# Patient Record
Sex: FEMALE | Race: WHITE | Employment: FULL TIME | URBAN - METROPOLITAN AREA
[De-identification: names, ages, dates, MRNs, and addresses within clinical notes are randomized per-mention and may not be internally consistent; named-entity substitution may affect disease eponyms.]

---

## 2018-10-31 ENCOUNTER — HOSPITAL ENCOUNTER (OUTPATIENT)
Dept: PHYSICAL THERAPY | Age: 48
Discharge: HOME | End: 2018-10-31
Payer: COMMERCIAL

## 2018-10-31 PROCEDURE — 97110 THERAPEUTIC EXERCISES: CPT

## 2018-10-31 PROCEDURE — 97162 PT EVAL MOD COMPLEX 30 MIN: CPT

## 2018-10-31 NOTE — THERAPY EVALUATION
Anne Whiting  : 1970 [id number] [facility],[address]  [address] [place] [address]  [phone]   [phone]        OUTPATIENT PHYSICAL THERAPY:Initial Assessment 2018         ICD-10: Treatment Diagnosis: Pain in left ankle and joints of left foot (M25.572); Difficulty in walking, not elsewhere classified (R26.2)  Precautions/Allergies:   Patient has no known allergies. Fall Risk Score: 0 (? 5 = High Risk)  MD Orders: evaluate and treat MEDICAL/REFERRING DIAGNOSIS:  Left ankle pain [M25.572]   DATE OF ONSET: chronic history with worsening over the past 6 weeks  REFERRING PHYSICIAN: Salas Moss MD  RETURN PHYSICIAN APPOINTMENT: 18     INITIAL ASSESSMENT:  Ms. Chirag Rodriguez presents to therapy with left ankle pain secondary to midsubstance achilles tendinosis with braden's deformity. She has restricted calf flexibility. She has weakness through the calf musculature, intrinsic foot musculature, and poor motor control of the hip musculature. Symptoms are moderately reactive. Skilled therapy is required to return to prior level of function. PROBLEM LIST (Impacting functional limitations):  1. Decreased Strength  2. Decreased ADL/Functional Activities  3. Decreased Ambulation Ability/Technique  4. Decreased Balance  5. Increased Pain  6. Decreased Activity Tolerance  7. Decreased Flexibility/Joint Mobility INTERVENTIONS PLANNED:  1. Family Education  2. Gait Training  3. Home Exercise Program (HEP)  4. Manual Therapy  5. Neuromuscular Re-education/Strengthening  6. Range of Motion (ROM)  7. Therapeutic Activites  8. Therapeutic Exercise/Strengthening  9. modalities    TREATMENT PLAN:  Effective Dates: 10/31/2018 TO 2019 (90 days). Frequency/Duration: 2 times a week for 90 Days  GOALS: (Goals have been discussed and agreed upon with patient.)  Short-Term Functional Goals: Time Frame: 2 weeks  1. Patient will be independent with HEP.    2. Patient will report pain <2/10 with daily activity. Discharge Goals: Time Frame: 6 weeks  1. Patient will report no pain with daily activity to perform work duties without use of boot. 2. Patient will be able to perform 10 single leg heel raises through full ROM to restore functional calf strength. 3. Patient will improve ankle DF to 15 ° to decrease strain on the achilles with daily activity such as stair descent. Rehabilitation Potential For Stated Goals: Excellent  Regarding Sonia Barfield's therapy, I certify that the treatment plan above will be carried out by a therapist or under their direction. Thank you for this referral,  Eunice Pastrana DPT                     HISTORY:   History of Present Injury/Illness (Reason for Referral):  Patient presents to therapy with primary complaint of left posterior heel pain. She notes a chronic history of heel pain that has been worsening over the past 6 weeks. She has recently had excruciating pain in the morning, pain with end range movements into passive plantar flexion, and pain with general walking activities. 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 Since her initial MD consult, she has begun to use a walking boot during the day and a compressive sock in the evening. This has significantly been helping. She has also begun to take meloxicam which has also been helping. Past Medical History/Comorbidities:   Ms. Lisandro Franks  has a past medical history of Morbid obesity ([address] Nausea & vomiting, Psychiatric disorder, and Unspecified adverse effect of anesthesia. Ms. Lisandro Franks  has a past surgical history that includes hx  section; hx other surgical (, ); and LIPOMA EXCISION MID BACK (N/A, 2/3/2014). Social History/Living Environment:     Patient lives at home with her family. Prior Level of Function/Work/Activity:  Patient is a .   Dominant Side:         RIGHT  Current Medications:    Current Outpatient Medications:     ondansetron (ZOFRAN ODT) 4 mg disintegrating tablet, Take 4 mg by mouth every eight (8) hours as needed for Nausea., Disp: , Rfl:     Desvenlafaxine (PRISTIQ) 100 mg Tb24, Take 1 Tab by mouth nightly., Disp: , Rfl:     multivitamin (ONE A DAY) tablet, Take 1 Tab by mouth daily. , Disp: , Rfl:     cholecalciferol, vitamin D3, (VITAMIN D3) 2,000 unit tab, Take 1 Tab by mouth daily. , Disp: , Rfl:    Date Last Reviewed:  10/31/2018   # of Personal Factors/Comorbidities that affect the Plan of Care: 1-2: MODERATE COMPLEXITY   EXAMINATION:   Observation/Orthostatic Postural Assessment:          Magui's deformity noted at the posterior heel. Mild rearfoot eversion present from initial contact to midstance. Minimal navicular drop present through stance. She has early heel rise in stance phase of gait. Palpation:          Tender through midsubstance of the achilles and over the posterior heel. ROM:     Ankle DF: 10 ° L; 15 ° R  Ankle PF: 40 ° B  Ankle inversion: WNL  Ankle eversion: WNL  Great toe extension is mildly restricted  Hip rotation is WNL      Strength: Ankle DF: 5/5   Ankle PF: 4/5 L; 5/5 R  Ankle inversion :4+/5 L; 5/5 R  Ankle eversion: 5/5 B  Hip extension: 4/5 B  Hip abduction: 4/5 B      Special Tests:          Slump and SLR testing are negative for provocation of symptoms. Mild discomfort with overpressure into ankle PF  Neurological Screen:        Sensation is fully intact  Functional Mobility:         Independent  Balance:          Increased hip righting reactions with left single leg stance   Body Structures Involved:  1. Nerves  2. Bones  3. Joints  4. Muscles  5. Ligaments Body Functions Affected:  1. Sensory/Pain  2. Neuromusculoskeletal  3. Movement Related Activities and Participation Affected:  1. Learning and Applying Knowledge  2. General Tasks and Demands  3. Mobility  4. Self Care  5. Domestic Life  6.  Interpersonal Interactions and Relationships  7. Community, Social and [place]   # of elements that affect the Plan of Care: 3: MODERATE COMPLEXITY   CLINICAL PRESENTATION:   Presentation: Evolving clinical presentation with changing clinical characteristics: MODERATE COMPLEXITY   CLINICAL DECISION MAKING:   Outcome Measure: Tool Used: Lower Extremity Functional Scale (LEFS)  Score:  Initial: 37/80 Most Recent: X/80 (Date: -- )   Interpretation of Score: 20 questions each scored on a 5 point scale with 0 representing \"extreme difficulty or unable to perform\" and 4 representing \"no difficulty\". The lower the score, the greater the functional disability. 80/80 represents no disability. Minimal detectable change is 9 points. Score 80 79-63 62-48 47-32 31-16 15-1 0   Modifier CH CI CJ CK CL CM CN       Medical Necessity:   · Patient is expected to demonstrate progress in strength, range of motion, balance and coordination to increase independence with community mobility and return to prior level of function. Reason for Services/Other Comments:  · Patient has demonstrated an improvement in functional level by independent performance of HEP. Use of outcome tool(s) and clinical judgement create a POC that gives a: Questionable prediction of patient's progress: MODERATE COMPLEXITY   TREATMENT:   (In addition to Assessment/Re-Assessment sessions the following treatments were rendered)  THERAPEUTIC EXERCISE: (see below for minutes):  Exercises per grid below to improve mobility, strength, balance and coordination. Required minimal verbal and manual cues to promote proper body alignment, promote proper body posture and promote proper body mechanics. Progressed resistance, range, repetitions and complexity of movement as indicated.   MANUAL THERAPY: (see below for minutes): Joint mobilization and Soft tissue mobilization was utilized and necessary because of the patient's restricted joint motion, painful spasm, loss of articular motion and restricted motion of soft tissue. MODALITIES: (see below for minutes): To decrease pain     Date: 10/31/18        Modalities:                                Therapeutic Exercise: 20 min       Stretching [id number] self ankle mobilization with knee drive to wall       Bridge [id number] double leg       Hip abduction 2x10       Calf raises Discussed progression of seated and standing double leg calf raises from [id number] to 3x8 reps                       Proprioceptive Activities:                                Manual Therapy:        Soft tissue mobilization Next visit               Therapeutic Activities:                                        HEP: see 10/31/18 flow sheet for specifics. Educated on managing load of calf raises based on symptom response. [employer] Portal  Treatment/Session Assessment:  Patient is independent with performance of above described home program.    · Pain/ Symptoms: Initial:   10/10 Post Session:  No increase following today's treatment session. ·   Compliance with Program/Exercises: Will assess as treatment progresses. · Recommendations/Intent for next treatment session: \"Next visit will focus on advancements to more challenging activities\".   Total Treatment Duration:  PT Patient Time In/Time Out  Time In: 1530  Time Out: [address], DPT

## 2018-10-31 NOTE — PROGRESS NOTES
Ambulatory/Rehab Services H2 Model Falls Risk Assessment    Risk Factor Pts. ·   Confusion/Disorientation/Impulsivity  []    4 ·   Symptomatic Depression  []   2 ·   Altered Elimination  []   1 ·   Dizziness/Vertigo  []   1 ·   Gender (Male)  []   1 ·   Any administered antiepileptics (anticonvulsants):  []   2 ·   Any administered benzodiazepines:  []   1 ·   Visual Impairment (specify):  []   1 ·   Portable Oxygen Use  []   1 ·   Orthostatic ? BP  []   1 ·   History of Recent Falls (within 3 mos.)  []   5     Ability to Rise from Chair (choose one) Pts. ·   Ability to rise in a single movement  [x]   0 ·   Pushes up, successful in one attempt  []   1 ·   Multiple attempts, but successful  []   3 ·   Unable to rise without assistance  []   4   Total: (5 or greater = High Risk) 0     Falls Prevention Plan:   []                Physical Limitations to Exercise (specify):   []                Mobility Assistance Device (type):   []                Exercise/Equipment Adaptation (specify):    ©2010 [facility] of [address] Patent #[id number].  Federal Law prohibits the replication, distribution or use without written permission from [facility] [employer]

## 2018-11-02 ENCOUNTER — APPOINTMENT (OUTPATIENT)
Dept: PHYSICAL THERAPY | Age: 48
End: 2018-11-02
Payer: COMMERCIAL

## 2018-11-05 ENCOUNTER — HOSPITAL ENCOUNTER (OUTPATIENT)
Dept: PHYSICAL THERAPY | Age: 48
Discharge: HOME | End: 2018-11-05
Payer: COMMERCIAL

## 2018-11-05 PROCEDURE — 97140 MANUAL THERAPY 1/> REGIONS: CPT

## 2018-11-05 PROCEDURE — 97110 THERAPEUTIC EXERCISES: CPT

## 2018-11-05 NOTE — THERAPY EVALUATION
Bee [address] : 1970 [address] @ [address] [place] [address]. [phone]   [phone] OUTPATIENT PHYSICAL THERAPY:Daily Note 2018 ICD-10: Treatment Diagnosis: Pain in left ankle and joints of left foot (M25.572); Difficulty in walking, not elsewhere classified (R26.2) Precautions/Allergies:  
Patient has no known allergies. Fall Risk Score: 0 (? 5 = High Risk) MD Orders: evaluate and treat MEDICAL/REFERRING DIAGNOSIS: 
Pain in left ankle and joints of left foot [M25.572] DATE OF ONSET: chronic history with worsening over the past 6 weeks REFERRING PHYSICIAN: Antelmo Pan MD 
RETURN PHYSICIAN APPOINTMENT: 18 INITIAL ASSESSMENT:  Ms. Hemalatha Waldron presents to therapy with left ankle pain secondary to midsubstance achilles tendinosis with braden's deformity. She has restricted calf flexibility. She has weakness through the calf musculature, intrinsic foot musculature, and poor motor control of the hip musculature. Symptoms are moderately reactive. Skilled therapy is required to return to prior level of function. PROBLEM LIST (Impacting functional limitations): 1. Decreased Strength 2. Decreased ADL/Functional Activities 3. Decreased Ambulation Ability/Technique 4. Decreased Balance 5. Increased Pain 6. Decreased Activity Tolerance 7. Decreased Flexibility/Joint Mobility INTERVENTIONS PLANNED: 
1. Family Education 2. Gait Training 3. Home Exercise Program (HEP) 4. Manual Therapy 5. Neuromuscular Re-education/Strengthening 6. Range of Motion (ROM) 7. Therapeutic Activites 8. Therapeutic Exercise/Strengthening 9. modalities TREATMENT PLAN: 
Effective Dates: 10/31/2018 TO 2019 (90 days). Frequency/Duration: 2 times a week for 90 Days GOALS: (Goals have been discussed and agreed upon with patient.) Short-Term Functional Goals: Time Frame: 2 weeks 1. Patient will be independent with HEP. 2. Patient will report pain <2/10 with daily activity. Discharge Goals: Time Frame: 6 weeks 1. Patient will report no pain with daily activity to perform work duties without use of boot. 2. Patient will be able to perform 10 single leg heel raises through full ROM to restore functional calf strength. 3. Patient will improve ankle DF to 15 ° to decrease strain on the achilles with daily activity such as stair descent. Rehabilitation Potential For Stated Goals: Excellent Regarding Consuelomarie Verdugo therapy, I certify that the treatment plan above will be carried out by a therapist or under their direction. Thank you for this referral, Sean Jorgensen DPT HISTORY:  
History of Present Injury/Illness (Reason for Referral): 
Patient presents to therapy with primary complaint of left posterior heel pain. She notes a chronic history of heel pain that has been worsening over the past 6 weeks. She has recently had excruciating pain in the morning, pain with end range movements into passive plantar flexion, and pain with general walking activities. 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 Since her initial MD consult, she has begun to use a walking boot during the day and a compressive sock in the evening. This has significantly been helping. She has also begun to take meloxicam which has also been helping. Past Medical History/Comorbidities:  
Ms. Jose Manuel Garrison  has a past medical history of Morbid obesity ([address] Nausea & vomiting, Psychiatric disorder, and Unspecified adverse effect of anesthesia. Ms. Jose Manuel Garrison  has a past surgical history that includes hx  section; hx other surgical (, ); and LIPOMA EXCISION MID BACK (N/A, 2/3/2014). Social History/Living Environment:  
  Patient lives at home with her family. Prior Level of Function/Work/Activity: 
Patient is a . Dominant Side:  
      RIGHT Current Medications: Current Outpatient Medications:  
  ondansetron (ZOFRAN ODT) 4 mg disintegrating tablet, Take 4 mg by mouth every eight (8) hours as needed for Nausea., Disp: , Rfl:  
  Desvenlafaxine (PRISTIQ) 100 mg Tb24, Take 1 Tab by mouth nightly., Disp: , Rfl:  
  multivitamin (ONE A DAY) tablet, Take 1 Tab by mouth daily. , Disp: , Rfl:  
  cholecalciferol, vitamin D3, (VITAMIN D3) 2,000 unit tab, Take 1 Tab by mouth daily. , Disp: , Rfl:   
Date Last Reviewed:  11/5/2018 EXAMINATION:  
Observation/Orthostatic Postural Assessment:   
      Magui's deformity noted at the posterior heel. Mild rearfoot eversion present from initial contact to midstance. Minimal navicular drop present through stance. She has early heel rise in stance phase of gait. Palpation:   
      Tender through midsubstance of the achilles and over the posterior heel. ROM:   
 Ankle DF: 10 ° L; 15 ° R Ankle PF: 40 ° B Ankle inversion: WNL Ankle eversion: WNL Great toe extension is mildly restricted Hip rotation is WNL Strength: Ankle DF: 5/5 Ankle PF: 4/5 L; 5/5 R Ankle inversion :4+/5 L; 5/5 R Ankle eversion: 5/5 B Hip extension: 4/5 B Hip abduction: 4/5 B Special Tests:   
      Slump and SLR testing are negative for provocation of symptoms. Mild discomfort with overpressure into ankle PF Neurological Screen: 
      Sensation is fully intact Functional Mobility:  
      Independent Balance:   
      Increased hip righting reactions with left single leg stance Body Structures Involved: 1. Nerves 2. Bones 3. Joints 4. Muscles 5. Ligaments Body Functions Affected: 1. Sensory/Pain 2. Neuromusculoskeletal 
3. Movement Related Activities and Participation Affected: 1. Learning and Applying Knowledge 2. General Tasks and Demands 3. Mobility 4. Self Care 5. Domestic Life 6. Interpersonal Interactions and Relationships 7. Community, Social and [place] CLINICAL PRESENTATION:  
CLINICAL DECISION MAKING:  
 Outcome Measure: Tool Used: Lower Extremity Functional Scale (LEFS) Score:  Initial: 37/80 Most Recent: X/80 (Date: -- ) Interpretation of Score: 20 questions each scored on a 5 point scale with 0 representing \"extreme difficulty or unable to perform\" and 4 representing \"no difficulty\". The lower the score, the greater the functional disability. 80/80 represents no disability. Minimal detectable change is 9 points. Score 80 79-63 62-48 47-32 31-16 15-1 0 Modifier CH CI CJ CK CL CM CN Medical Necessity:  
· Patient is expected to demonstrate progress in strength, range of motion, balance and coordination to increase independence with community mobility and return to prior level of function. Reason for Services/Other Comments: 
· Patient has demonstrated an improvement in functional level by independent performance of HEP. TREATMENT:  
(In addition to Assessment/Re-Assessment sessions the following treatments were rendered) THERAPEUTIC EXERCISE: (see below for minutes):  Exercises per grid below to improve mobility, strength, balance and coordination. Required minimal verbal and manual cues to promote proper body alignment, promote proper body posture and promote proper body mechanics. Progressed resistance, range, repetitions and complexity of movement as indicated. MANUAL THERAPY: (see below for minutes): Joint mobilization and Soft tissue mobilization was utilized and necessary because of the patient's restricted joint motion, painful spasm, loss of articular motion and restricted motion of soft tissue. MODALITIES: (see below for minutes): To decrease pain Date: 10/31/18  11/05/18 (visit 2) Modalities:       
       
       
       
Therapeutic Exercise: 20 min 30 min Stretching [id number] self ankle mobilization with knee drive to wall repeat Bridge [id number] double leg Hip abduction 2x10 Calf raises Discussed progression of seated and standing double leg calf raises from [id number] to 3x8 reps Seated 15# 3x12; standing eccentric 4x8 with slow lower Inversion/Eversion  Black tband 2x15 each NuStep  5 min L4 Proprioceptive Activities:       
       
       
       
Manual Therapy:  15 min Soft tissue mobilization Next visit To posterior calf, posterior tibialis Therapeutic Activities: HEP: see 10/31/18 flow sheet for specifics. Educated on managing load of calf raises based on symptom response. [employer] Portal 
Treatment/Session Assessment:  Able to progress to standing eccentrics. This is initially moderately painful over the first few reps but becomes pain free by end of the final set. · Pain/ Symptoms: Initial:   4/10 \"It's feeling better. I still get a lot of stiffness in the morning and after I've been sitting awhile. \" Post Session:  No increase following today's treatment session. ·  
Compliance with Program/Exercises: Will assess as treatment progresses. · Recommendations/Intent for next treatment session: \"Next visit will focus on advancements to more challenging activities\". Total Treatment Duration: PT Patient Time In/Time Out Time In: 5681 Time Out: 1530 [place] DPT

## 2018-11-07 ENCOUNTER — HOSPITAL ENCOUNTER (OUTPATIENT)
Dept: PHYSICAL THERAPY | Age: 48
Discharge: HOME | End: 2018-11-07
Payer: COMMERCIAL

## 2018-11-07 PROCEDURE — 97110 THERAPEUTIC EXERCISES: CPT

## 2018-11-07 PROCEDURE — 97140 MANUAL THERAPY 1/> REGIONS: CPT

## 2018-11-07 NOTE — THERAPY EVALUATION
Barby Jung : 1970 [id number] @ [address] [address]. [phone]   [phone] OUTPATIENT PHYSICAL THERAPY:Daily Note 2018 ICD-10: Treatment Diagnosis: Pain in left ankle and joints of left foot (M25.572); Difficulty in walking, not elsewhere classified (R26.2) Precautions/Allergies:  
Patient has no known allergies. Fall Risk Score: 0 (? 5 = High Risk) MD Orders: evaluate and treat MEDICAL/REFERRING DIAGNOSIS: 
Pain in left ankle and joints of left foot [M25.572] DATE OF ONSET: chronic history with worsening over the past 6 weeks REFERRING PHYSICIAN: Eze Jensen MD 
RETURN PHYSICIAN APPOINTMENT: 18 INITIAL ASSESSMENT:  Ms. Ko Fierro presents to therapy with left ankle pain secondary to midsubstance achilles tendinosis with braden's deformity. She has restricted calf flexibility. She has weakness through the calf musculature, intrinsic foot musculature, and poor motor control of the hip musculature. Symptoms are moderately reactive. Skilled therapy is required to return to prior level of function. PROBLEM LIST (Impacting functional limitations): 1. Decreased Strength 2. Decreased ADL/Functional Activities 3. Decreased Ambulation Ability/Technique 4. Decreased Balance 5. Increased Pain 6. Decreased Activity Tolerance 7. Decreased Flexibility/Joint Mobility INTERVENTIONS PLANNED: 
1. Family Education 2. Gait Training 3. Home Exercise Program (HEP) 4. Manual Therapy 5. Neuromuscular Re-education/Strengthening 6. Range of Motion (ROM) 7. Therapeutic Activites 8. Therapeutic Exercise/Strengthening 9. modalities TREATMENT PLAN: 
Effective Dates: 10/31/2018 TO 2019 (90 days). Frequency/Duration: 2 times a week for 90 Days GOALS: (Goals have been discussed and agreed upon with patient.) Short-Term Functional Goals: Time Frame: 2 weeks 1. Patient will be independent with HEP. 2. Patient will report pain <2/10 with daily activity. Discharge Goals: Time Frame: 6 weeks 1. Patient will report no pain with daily activity to perform work duties without use of boot. 2. Patient will be able to perform 10 single leg heel raises through full ROM to restore functional calf strength. 3. Patient will improve ankle DF to 15 ° to decrease strain on the achilles with daily activity such as stair descent. Rehabilitation Potential For Stated Goals: Excellent Regarding Consuelo Verdugo therapy, I certify that the treatment plan above will be carried out by a therapist or under their direction. Thank you for this referral, Zachary Madsen DPT HISTORY:  
History of Present Injury/Illness (Reason for Referral): 
Patient presents to therapy with primary complaint of left posterior heel pain. She notes a chronic history of heel pain that has been worsening over the past 6 weeks. She has recently had excruciating pain in the morning, pain with end range movements into passive plantar flexion, and pain with general walking activities. 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 Since her initial MD consult, she has begun to use a walking boot during the day and a compressive sock in the evening. This has significantly been helping. She has also begun to take meloxicam which has also been helping. Past Medical History/Comorbidities:  
Ms. Jolynn Thornton  has a past medical history of Morbid obesity ([address] Nausea & vomiting, Psychiatric disorder, and Unspecified adverse effect of anesthesia. Ms. Jolynn Thornton  has a past surgical history that includes hx  section; hx other surgical (, ); and LIPOMA EXCISION MID BACK (N/A, 2/3/2014). Social History/Living Environment:  
  Patient lives at home with her family. Prior Level of Function/Work/Activity: 
Patient is a . Dominant Side:  
      RIGHT Current Medications: Current Outpatient Medications:  
  ondansetron (ZOFRAN ODT) 4 mg disintegrating tablet, Take 4 mg by mouth every eight (8) hours as needed for Nausea., Disp: , Rfl:  
  Desvenlafaxine (PRISTIQ) 100 mg Tb24, Take 1 Tab by mouth nightly., Disp: , Rfl:  
  multivitamin (ONE A DAY) tablet, Take 1 Tab by mouth daily. , Disp: , Rfl:  
  cholecalciferol, vitamin D3, (VITAMIN D3) 2,000 unit tab, Take 1 Tab by mouth daily. , Disp: , Rfl:   
Date Last Reviewed:  11/7/2018 EXAMINATION:  
Observation/Orthostatic Postural Assessment:   
      Magui's deformity noted at the posterior heel. Mild rearfoot eversion present from initial contact to midstance. Minimal navicular drop present through stance. She has early heel rise in stance phase of gait. Palpation:   
      Tender through midsubstance of the achilles and over the posterior heel. ROM:   
 Ankle DF: 10 ° L; 15 ° R Ankle PF: 40 ° B Ankle inversion: WNL Ankle eversion: WNL Great toe extension is mildly restricted Hip rotation is WNL Strength: Ankle DF: 5/5 Ankle PF: 4/5 L; 5/5 R Ankle inversion :4+/5 L; 5/5 R Ankle eversion: 5/5 B Hip extension: 4/5 B Hip abduction: 4/5 B Special Tests:   
      Slump and SLR testing are negative for provocation of symptoms. Mild discomfort with overpressure into ankle PF Neurological Screen: 
      Sensation is fully intact Functional Mobility:  
      Independent Balance:   
      Increased hip righting reactions with left single leg stance Body Structures Involved: 1. Nerves 2. Bones 3. Joints 4. Muscles 5. Ligaments Body Functions Affected: 1. Sensory/Pain 2. Neuromusculoskeletal 
3. Movement Related Activities and Participation Affected: 1. Learning and Applying Knowledge 2. General Tasks and Demands 3. Mobility 4. Self Care 5. Domestic Life 6. Interpersonal Interactions and Relationships 7. Community, Social and [place] CLINICAL PRESENTATION:  
CLINICAL DECISION MAKING:  
 Outcome Measure: Tool Used: Lower Extremity Functional Scale (LEFS) Score:  Initial: 37/80 Most Recent: X/80 (Date: -- ) Interpretation of Score: 20 questions each scored on a 5 point scale with 0 representing \"extreme difficulty or unable to perform\" and 4 representing \"no difficulty\". The lower the score, the greater the functional disability. 80/80 represents no disability. Minimal detectable change is 9 points. Score 80 79-63 62-48 47-32 31-16 15-1 0 Modifier CH CI CJ CK CL CM CN Medical Necessity:  
· Patient is expected to demonstrate progress in strength, range of motion, balance and coordination to increase independence with community mobility and return to prior level of function. Reason for Services/Other Comments: 
· Patient has demonstrated an improvement in functional level by independent performance of HEP. TREATMENT:  
(In addition to Assessment/Re-Assessment sessions the following treatments were rendered) THERAPEUTIC EXERCISE: (see below for minutes):  Exercises per grid below to improve mobility, strength, balance and coordination. Required minimal verbal and manual cues to promote proper body alignment, promote proper body posture and promote proper body mechanics. Progressed resistance, range, repetitions and complexity of movement as indicated. MANUAL THERAPY: (see below for minutes): Joint mobilization and Soft tissue mobilization was utilized and necessary because of the patient's restricted joint motion, painful spasm, loss of articular motion and restricted motion of soft tissue. MODALITIES: (see below for minutes): To decrease pain Date: 10/31/18  11/05/18 (visit 2) 11/07/18 (visit 3) Modalities:       
       
       
       
Therapeutic Exercise: 20 min 30 min 30 min Stretching [id number] self ankle mobilization with knee drive to wall repeat repeat Bridge [id number] double leg Hip abduction 2x10 Calf raises Discussed progression of seated and standing double leg calf raises from [id number] to 3x8 reps Seated 15# 3x12; standing eccentric 4x8 with slow lower repeat Inversion/Eversion  Black tband 2x15 each repeat NuStep  5 min L4 repeat Proprioceptive Activities:       
       
       
       
Manual Therapy:  15 min 15 min Soft tissue mobilization Next visit To posterior calf, posterior tibialis repeat Therapeutic Activities: HEP: see 10/31/18 flow sheet for specifics. Educated on managing load of calf raises based on symptom response. [employer] Portal 
Treatment/Session Assessment: Patient demonstrates sufficient symptom resolution to progress to increased resistance on seated calf raise (with LAQ machine). · Pain/ Symptoms: Initial:   3/10 \"The new calf raises are feeling good. It seems to be much less painful. \" Post Session:  No increase following today's treatment session. ·  
Compliance with Program/Exercises: Will assess as treatment progresses. · Recommendations/Intent for next treatment session: \"Next visit will focus on advancements to more challenging activities\". Total Treatment Duration: PT Patient Time In/Time Out Time In: 0631 Time Out: 1700 Ronda Alaniz DPT

## 2018-11-12 ENCOUNTER — APPOINTMENT (OUTPATIENT)
Dept: PHYSICAL THERAPY | Age: 48
End: 2018-11-12
Payer: COMMERCIAL

## 2018-11-13 ENCOUNTER — HOSPITAL ENCOUNTER (OUTPATIENT)
Dept: PHYSICAL THERAPY | Age: 48
Discharge: HOME | End: 2018-11-13
Payer: COMMERCIAL

## 2018-11-13 PROCEDURE — 97140 MANUAL THERAPY 1/> REGIONS: CPT

## 2018-11-13 PROCEDURE — 97110 THERAPEUTIC EXERCISES: CPT

## 2018-11-13 NOTE — THERAPY EVALUATION
Ramon De Guzman : 1970 [id number] [address] @ [address], [address] [place] [address] [phone]   [phone] OUTPATIENT PHYSICAL THERAPY:Daily Note 2018 ICD-10: Treatment Diagnosis: Pain in left ankle and joints of left foot (M25.572); Difficulty in walking, not elsewhere classified (R26.2) Precautions/Allergies:  
Patient has no known allergies. Fall Risk Score: 0 (? 5 = High Risk) MD Orders: evaluate and treat MEDICAL/REFERRING DIAGNOSIS: 
No admission diagnoses are documented for this encounter. DATE OF ONSET: chronic history with worsening over the past 6 weeks REFERRING PHYSICIAN: Orlando Jones MD 
RETURN PHYSICIAN APPOINTMENT: 18 INITIAL ASSESSMENT:  Ms. Jeremy Luna presents to therapy with left ankle pain secondary to midsubstance achilles tendinosis with braden's deformity. She has restricted calf flexibility. She has weakness through the calf musculature, intrinsic foot musculature, and poor motor control of the hip musculature. Symptoms are moderately reactive. Skilled therapy is required to return to prior level of function. PROBLEM LIST (Impacting functional limitations): 1. Decreased Strength 2. Decreased ADL/Functional Activities 3. Decreased Ambulation Ability/Technique 4. Decreased Balance 5. Increased Pain 6. Decreased Activity Tolerance 7. Decreased Flexibility/Joint Mobility INTERVENTIONS PLANNED: 
1. Family Education 2. Gait Training 3. Home Exercise Program (HEP) 4. Manual Therapy 5. Neuromuscular Re-education/Strengthening 6. Range of Motion (ROM) 7. Therapeutic Activites 8. Therapeutic Exercise/Strengthening 9. modalities TREATMENT PLAN: 
Effective Dates: 10/31/2018 TO 2019 (90 days). Frequency/Duration: 2 times a week for 90 Days GOALS: (Goals have been discussed and agreed upon with patient.) Short-Term Functional Goals: Time Frame: 2 weeks 1. Patient will be independent with HEP. 2. Patient will report pain <2/10 with daily activity. Discharge Goals: Time Frame: 6 weeks 1. Patient will report no pain with daily activity to perform work duties without use of boot. 2. Patient will be able to perform 10 single leg heel raises through full ROM to restore functional calf strength. 3. Patient will improve ankle DF to 15 ° to decrease strain on the achilles with daily activity such as stair descent. Rehabilitation Potential For Stated Goals: Excellent Regarding Consuelo Kartik therapy, I certify that the treatment plan above will be carried out by a therapist or under their direction. Thank you for this referral, Ramona Deleon DPT HISTORY:  
History of Present Injury/Illness (Reason for Referral): 
Patient presents to therapy with primary complaint of left posterior heel pain. She notes a chronic history of heel pain that has been worsening over the past 6 weeks. She has recently had excruciating pain in the morning, pain with end range movements into passive plantar flexion, and pain with general walking activities. 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 Since her initial MD consult, she has begun to use a walking boot during the day and a compressive sock in the evening. This has significantly been helping. She has also begun to take meloxicam which has also been helping. Past Medical History/Comorbidities:  
Ms. Yeni Scruggs  has a past medical history of Morbid obesity ([address] Nausea & vomiting, Psychiatric disorder, and Unspecified adverse effect of anesthesia. Ms. Yeni Scruggs  has a past surgical history that includes hx  section; hx other surgical (, ); and LIPOMA EXCISION MID BACK (N/A, 2/3/2014). Social History/Living Environment:  
  Patient lives at home with her family. Prior Level of Function/Work/Activity: 
Patient is a . Dominant Side:  
      RIGHT Current Medications:   
Current Outpatient Medications:  
  ondansetron (ZOFRAN ODT) 4 mg disintegrating tablet, Take 4 mg by mouth every eight (8) hours as needed for Nausea., Disp: , Rfl:  
  Desvenlafaxine (PRISTIQ) 100 mg Tb24, Take 1 Tab by mouth nightly., Disp: , Rfl:  
  multivitamin (ONE A DAY) tablet, Take 1 Tab by mouth daily. , Disp: , Rfl:  
  cholecalciferol, vitamin D3, (VITAMIN D3) 2,000 unit tab, Take 1 Tab by mouth daily. , Disp: , Rfl:   
Date Last Reviewed:  11/13/2018 EXAMINATION:  
Observation/Orthostatic Postural Assessment:   
      Magui's deformity noted at the posterior heel. Mild rearfoot eversion present from initial contact to midstance. Minimal navicular drop present through stance. She has early heel rise in stance phase of gait. Palpation:   
      Tender through midsubstance of the achilles and over the posterior heel. ROM:   
 Ankle DF: 10 ° L; 15 ° R Ankle PF: 40 ° B Ankle inversion: WNL Ankle eversion: WNL Great toe extension is mildly restricted Hip rotation is WNL Strength: Ankle DF: 5/5 Ankle PF: 4/5 L; 5/5 R Ankle inversion :4+/5 L; 5/5 R Ankle eversion: 5/5 B Hip extension: 4/5 B Hip abduction: 4/5 B Special Tests:   
      Slump and SLR testing are negative for provocation of symptoms. Mild discomfort with overpressure into ankle PF Neurological Screen: 
      Sensation is fully intact Functional Mobility:  
      Independent Balance:   
      Increased hip righting reactions with left single leg stance Body Structures Involved: 1. Nerves 2. Bones 3. Joints 4. Muscles 5. Ligaments Body Functions Affected: 1. Sensory/Pain 2. Neuromusculoskeletal 
3. Movement Related Activities and Participation Affected: 1. Learning and Applying Knowledge 2. General Tasks and Demands 3. Mobility 4. Self Care 5. Domestic Life 6. Interpersonal Interactions and Relationships 7. Community, Social and [place] CLINICAL PRESENTATION:  
CLINICAL DECISION MAKING:  
Outcome Measure: Tool Used: Lower Extremity Functional Scale (LEFS) Score:  Initial: 37/80 Most Recent: X/80 (Date: -- ) Interpretation of Score: 20 questions each scored on a 5 point scale with 0 representing \"extreme difficulty or unable to perform\" and 4 representing \"no difficulty\". The lower the score, the greater the functional disability. 80/80 represents no disability. Minimal detectable change is 9 points. Score 80 79-63 62-48 47-32 31-16 15-1 0 Modifier CH CI CJ CK CL CM CN Medical Necessity:  
· Patient is expected to demonstrate progress in strength, range of motion, balance and coordination to increase independence with community mobility and return to prior level of function. Reason for Services/Other Comments: 
· Patient has demonstrated an improvement in functional level by independent performance of HEP. TREATMENT:  
(In addition to Assessment/Re-Assessment sessions the following treatments were rendered) THERAPEUTIC EXERCISE: (see below for minutes):  Exercises per grid below to improve mobility, strength, balance and coordination. Required minimal verbal and manual cues to promote proper body alignment, promote proper body posture and promote proper body mechanics. Progressed resistance, range, repetitions and complexity of movement as indicated. MANUAL THERAPY: (see below for minutes): Joint mobilization and Soft tissue mobilization was utilized and necessary because of the patient's restricted joint motion, painful spasm, loss of articular motion and restricted motion of soft tissue. MODALITIES: (see below for minutes): To decrease pain Date: 10/31/18  11/05/18 (visit 2) 11/07/18 (visit 3) 11/13/18 (visit 4) Modalities:       
       
       
       
Therapeutic Exercise: 20 min 30 min 30 min 30 min Stretching [id number] self ankle mobilization with knee drive to wall repeat repeat repeat Bridge [id number] double leg Hip abduction 2x10 Calf raises Discussed progression of seated and standing double leg calf raises from [id number] to 3x8 reps Seated 15# 3x12; standing eccentric 4x8 with slow lower repeat Standing eccentric 4x8 from wedge, seated 50# on LAQ 4x8 Inversion/Eversion  Black tband 2x15 each repeat repeat Lateral band walks    Black clx x 3 laps NuStep  5 min L4 repeat 5 min L4 Proprioceptive Activities:       
       
       
       
Manual Therapy:  15 min 15 min 15 min Soft tissue mobilization Next visit To posterior calf, posterior tibialis repeat repeat Therapeutic Activities: HEP: see 10/31/18 flow sheet for specifics. Educated on managing load of calf raises based on symptom response. "[employer] Portal 
Treatment/Session Assessment: Progressed to 50# on seated calf raise with no symptom provocation. · Pain/ Symptoms: Initial:   3/10 Having decreased pain with using CAM boot. Utilizing heel lift in contralateral shoe which helps decrease hip pain. Post Session:  No increase following today's treatment session. ·  
Compliance with Program/Exercises: Will assess as treatment progresses. · Recommendations/Intent for next treatment session: \"Next visit will focus on advancements to more challenging activities\". Total Treatment Duration: PT Patient Time In/Time Out Time In: 1400 Time Out: 1427 [place] Farooq DPT

## 2018-11-14 ENCOUNTER — HOSPITAL ENCOUNTER (OUTPATIENT)
Dept: PHYSICAL THERAPY | Age: 48
Discharge: HOME | End: 2018-11-14
Payer: COMMERCIAL

## 2018-11-14 PROCEDURE — 97140 MANUAL THERAPY 1/> REGIONS: CPT

## 2018-11-14 PROCEDURE — 97110 THERAPEUTIC EXERCISES: CPT

## 2018-11-14 NOTE — THERAPY EVALUATION
Natalie Medina : 1970 Ka[place] Baca @ [address] [address]. [phone]   [phone] OUTPATIENT PHYSICAL THERAPY:Daily Note 2018 ICD-10: Treatment Diagnosis: Pain in left ankle and joints of left foot (M25.572); Difficulty in walking, not elsewhere classified (R26.2) Precautions/Allergies:  
Patient has no known allergies. Fall Risk Score: 0 (? 5 = High Risk) MD Orders: evaluate and treat MEDICAL/REFERRING DIAGNOSIS: 
Pain in left ankle and joints of left foot [M25.572] DATE OF ONSET: chronic history with worsening over the past 6 weeks REFERRING PHYSICIAN: Kendal Goldberg MD 
RETURN PHYSICIAN APPOINTMENT: 18 INITIAL ASSESSMENT:  Ms. Renetta Barragan presents to therapy with left ankle pain secondary to midsubstance achilles tendinosis with braden's deformity. She has restricted calf flexibility. She has weakness through the calf musculature, intrinsic foot musculature, and poor motor control of the hip musculature. Symptoms are moderately reactive. Skilled therapy is required to return to prior level of function. PROBLEM LIST (Impacting functional limitations): 1. Decreased Strength 2. Decreased ADL/Functional Activities 3. Decreased Ambulation Ability/Technique 4. Decreased Balance 5. Increased Pain 6. Decreased Activity Tolerance 7. Decreased Flexibility/Joint Mobility INTERVENTIONS PLANNED: 
1. Family Education 2. Gait Training 3. Home Exercise Program (HEP) 4. Manual Therapy 5. Neuromuscular Re-education/Strengthening 6. Range of Motion (ROM) 7. Therapeutic Activites 8. Therapeutic Exercise/Strengthening 9. modalities TREATMENT PLAN: 
Effective Dates: 10/31/2018 TO 2019 (90 days). Frequency/Duration: 2 times a week for 90 Days GOALS: (Goals have been discussed and agreed upon with patient.) Short-Term Functional Goals: Time Frame: 2 weeks 1. Patient will be independent with HEP. 2. Patient will report pain <2/10 with daily activity. Discharge Goals: Time Frame: 6 weeks 1. Patient will report no pain with daily activity to perform work duties without use of boot. 2. Patient will be able to perform 10 single leg heel raises through full ROM to restore functional calf strength. 3. Patient will improve ankle DF to 15 ° to decrease strain on the achilles with daily activity such as stair descent. Rehabilitation Potential For Stated Goals: Excellent Regarding Consuelo Kartik therapy, I certify that the treatment plan above will be carried out by a therapist or under their direction. Thank you for this referral, Brennen Luevano DPT HISTORY:  
History of Present Injury/Illness (Reason for Referral): 
Patient presents to therapy with primary complaint of left posterior heel pain. She notes a chronic history of heel pain that has been worsening over the past 6 weeks. She has recently had excruciating pain in the morning, pain with end range movements into passive plantar flexion, and pain with general walking activities. 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 Since her initial MD consult, she has begun to use a walking boot during the day and a compressive sock in the evening. This has significantly been helping. She has also begun to take meloxicam which has also been helping. Past Medical History/Comorbidities:  
Ms. Wilman Barroso  has a past medical history of Morbid obesity ([address] Nausea & vomiting, Psychiatric disorder, and Unspecified adverse effect of anesthesia. Ms. Wilman Barroso  has a past surgical history that includes hx  section; hx other surgical (, ); and LIPOMA EXCISION MID BACK (N/A, 2/3/2014). Social History/Living Environment:  
  Patient lives at home with her family. Prior Level of Function/Work/Activity: 
Patient is a . Dominant Side:  
      RIGHT Current Medications: Current Outpatient Medications:  
  ondansetron (ZOFRAN ODT) 4 mg disintegrating tablet, Take 4 mg by mouth every eight (8) hours as needed for Nausea., Disp: , Rfl:  
  Desvenlafaxine (PRISTIQ) 100 mg Tb24, Take 1 Tab by mouth nightly., Disp: , Rfl:  
  multivitamin (ONE A DAY) tablet, Take 1 Tab by mouth daily. , Disp: , Rfl:  
  cholecalciferol, vitamin D3, (VITAMIN D3) 2,000 unit tab, Take 1 Tab by mouth daily. , Disp: , Rfl:   
Date Last Reviewed:  11/14/2018 EXAMINATION:  
Observation/Orthostatic Postural Assessment:   
      Magui's deformity noted at the posterior heel. Mild rearfoot eversion present from initial contact to midstance. Minimal navicular drop present through stance. She has early heel rise in stance phase of gait. Palpation:   
      Tender through midsubstance of the achilles and over the posterior heel. ROM:   
 Ankle DF: 10 ° L; 15 ° R Ankle PF: 40 ° B Ankle inversion: WNL Ankle eversion: WNL Great toe extension is mildly restricted Hip rotation is WNL Strength: Ankle DF: 5/5 Ankle PF: 4/5 L; 5/5 R Ankle inversion :4+/5 L; 5/5 R Ankle eversion: 5/5 B Hip extension: 4/5 B Hip abduction: 4/5 B Special Tests:   
      Slump and SLR testing are negative for provocation of symptoms. Mild discomfort with overpressure into ankle PF Neurological Screen: 
      Sensation is fully intact Functional Mobility:  
      Independent Balance:   
      Increased hip righting reactions with left single leg stance Body Structures Involved: 1. Nerves 2. Bones 3. Joints 4. Muscles 5. Ligaments Body Functions Affected: 1. Sensory/Pain 2. Neuromusculoskeletal 
3. Movement Related Activities and Participation Affected: 1. Learning and Applying Knowledge 2. General Tasks and Demands 3. Mobility 4. Self Care 5. Domestic Life 6. Interpersonal Interactions and Relationships 7. Community, Social and [place] CLINICAL PRESENTATION:  
CLINICAL DECISION MAKING:  
 Outcome Measure: Tool Used: Lower Extremity Functional Scale (LEFS) Score:  Initial: 37/80 Most Recent: X/80 (Date: -- ) Interpretation of Score: 20 questions each scored on a 5 point scale with 0 representing \"extreme difficulty or unable to perform\" and 4 representing \"no difficulty\". The lower the score, the greater the functional disability. 80/80 represents no disability. Minimal detectable change is 9 points. Score 80 79-63 62-48 47-32 31-16 15-1 0 Modifier CH CI CJ CK CL CM CN Medical Necessity:  
· Patient is expected to demonstrate progress in strength, range of motion, balance and coordination to increase independence with community mobility and return to prior level of function. Reason for Services/Other Comments: 
· Patient has demonstrated an improvement in functional level by independent performance of HEP. TREATMENT:  
(In addition to Assessment/Re-Assessment sessions the following treatments were rendered) THERAPEUTIC EXERCISE: (see below for minutes):  Exercises per grid below to improve mobility, strength, balance and coordination. Required minimal verbal and manual cues to promote proper body alignment, promote proper body posture and promote proper body mechanics. Progressed resistance, range, repetitions and complexity of movement as indicated. MANUAL THERAPY: (see below for minutes): Joint mobilization and Soft tissue mobilization was utilized and necessary because of the patient's restricted joint motion, painful spasm, loss of articular motion and restricted motion of soft tissue. MODALITIES: (see below for minutes): To decrease pain Date: 10/31/18  11/05/18 (visit 2) 11/07/18 (visit 3) 11/13/18 (visit 4) 11/14/18 (visit 5) Modalities:       
       
       
       
Therapeutic Exercise: 20 min 30 min 30 min 30 min 30 min Stretching [id number] self ankle mobilization with knee drive to wall repeat repeat repeat repeat Bridge [id number] double leg Hip abduction 2x10 Calf raises Discussed progression of seated and standing double leg calf raises from [id number] to 3x8 reps Seated 15# 3x12; standing eccentric 4x8 with slow lower repeat Standing eccentric 4x8 from wedge, seated 50# on LAQ 4x8 Seated 75# 4x8 single leg with forefoot on half roll Inversion/Eversion  Black tband 2x15 each repeat repeat repeat Lateral band walks    Black clx x 3 laps Great toe flexion     3x20 green tband NuStep  5 min L4 repeat 5 min L4 6 min L$  
Proprioceptive Activities:       
       
       
       
Manual Therapy:  15 min 15 min 15 min 15 min Soft tissue mobilization Next visit To posterior calf, posterior tibialis repeat repeat Repeat; focusing on gastroc-soleus junction at mid calf Therapeutic Activities: HEP: see 10/31/18 flow sheet for specifics. Educated on managing load of calf raises based on symptom response. [employer] Portal 
Treatment/Session Assessment: Progressed to 50# on seated calf raise with no symptom provocation. · Pain/ Symptoms: Initial:   3/10 \"It's feeling better. I've started to not use the boot quite as much. \" Post Session:  No increase following today's treatment session. ·  
Compliance with Program/Exercises: Will assess as treatment progresses. · Recommendations/Intent for next treatment session: \"Next visit will focus on advancements to more challenging activities\". Total Treatment Duration: PT Patient Time In/Time Out Time In: 6978 Time Out: 1530 Merrily Every, DPT

## 2018-11-19 ENCOUNTER — HOSPITAL ENCOUNTER (OUTPATIENT)
Dept: PHYSICAL THERAPY | Age: 48
Discharge: HOME | End: 2018-11-19
Payer: COMMERCIAL

## 2018-11-19 PROCEDURE — 97140 MANUAL THERAPY 1/> REGIONS: CPT

## 2018-11-19 PROCEDURE — 97110 THERAPEUTIC EXERCISES: CPT

## 2018-11-19 NOTE — THERAPY EVALUATION
Leeanne Mcgee : 1970 [address] [facility] @ [address] [place] [address] [phone]   [phone] OUTPATIENT PHYSICAL THERAPY:Daily Note 2018 ICD-10: Treatment Diagnosis: Pain in left ankle and joints of left foot (M25.572); Difficulty in walking, not elsewhere classified (R26.2) Precautions/Allergies:  
Patient has no known allergies. Fall Risk Score: 0 (? 5 = High Risk) MD Orders: evaluate and treat MEDICAL/REFERRING DIAGNOSIS: 
Pain in left ankle and joints of left foot [M25.572] DATE OF ONSET: chronic history with worsening over the past 6 weeks REFERRING PHYSICIAN: Krystal Springer MD 
RETURN PHYSICIAN APPOINTMENT: 18 INITIAL ASSESSMENT:  Ms. Heather Robledo presents to therapy with left ankle pain secondary to midsubstance achilles tendinosis with braden's deformity. She has restricted calf flexibility. She has weakness through the calf musculature, intrinsic foot musculature, and poor motor control of the hip musculature. Symptoms are moderately reactive. Skilled therapy is required to return to prior level of function. PROBLEM LIST (Impacting functional limitations): 1. Decreased Strength 2. Decreased ADL/Functional Activities 3. Decreased Ambulation Ability/Technique 4. Decreased Balance 5. Increased Pain 6. Decreased Activity Tolerance 7. Decreased Flexibility/Joint Mobility INTERVENTIONS PLANNED: 
1. Family Education 2. Gait Training 3. Home Exercise Program (HEP) 4. Manual Therapy 5. Neuromuscular Re-education/Strengthening 6. Range of Motion (ROM) 7. Therapeutic Activites 8. Therapeutic Exercise/Strengthening 9. modalities TREATMENT PLAN: 
Effective Dates: 10/31/2018 TO 2019 (90 days). Frequency/Duration: 2 times a week for 90 Days GOALS: (Goals have been discussed and agreed upon with patient.) Short-Term Functional Goals: Time Frame: 2 weeks 1. Patient will be independent with HEP. 2. Patient will report pain <2/10 with daily activity. Discharge Goals: Time Frame: 6 weeks 1. Patient will report no pain with daily activity to perform work duties without use of boot. 2. Patient will be able to perform 10 single leg heel raises through full ROM to restore functional calf strength. 3. Patient will improve ankle DF to 15 ° to decrease strain on the achilles with daily activity such as stair descent. Rehabilitation Potential For Stated Goals: Excellent Regarding Consuelomarie Verdugo therapy, I certify that the treatment plan above will be carried out by a therapist or under their direction. Thank you for this referral, Lo Harrell DPT HISTORY:  
History of Present Injury/Illness (Reason for Referral): 
Patient presents to therapy with primary complaint of left posterior heel pain. She notes a chronic history of heel pain that has been worsening over the past 6 weeks. She has recently had excruciating pain in the morning, pain with end range movements into passive plantar flexion, and pain with general walking activities. 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 Since her initial MD consult, she has begun to use a walking boot during the day and a compressive sock in the evening. This has significantly been helping. She has also begun to take meloxicam which has also been helping. Past Medical History/Comorbidities:  
Ms. Brandt Land  has a past medical history of Morbid obesity ([address] Nausea & vomiting, Psychiatric disorder, and Unspecified adverse effect of anesthesia. Ms. Brandt Land  has a past surgical history that includes hx  section; hx other surgical (, ); and LIPOMA EXCISION MID BACK (N/A, 2/3/2014). Social History/Living Environment:  
  Patient lives at home with her family. Prior Level of Function/Work/Activity: 
Patient is a . Dominant Side:  
      RIGHT Current Medications: Current Outpatient Medications:  
  ondansetron (ZOFRAN ODT) 4 mg disintegrating tablet, Take 4 mg by mouth every eight (8) hours as needed for Nausea., Disp: , Rfl:  
  Desvenlafaxine (PRISTIQ) 100 mg Tb24, Take 1 Tab by mouth nightly., Disp: , Rfl:  
  multivitamin (ONE A DAY) tablet, Take 1 Tab by mouth daily. , Disp: , Rfl:  
  cholecalciferol, vitamin D3, (VITAMIN D3) 2,000 unit tab, Take 1 Tab by mouth daily. , Disp: , Rfl:   
Date Last Reviewed:  11/19/2018 EXAMINATION:  
Observation/Orthostatic Postural Assessment:   
      Magui's deformity noted at the posterior heel. Mild rearfoot eversion present from initial contact to midstance. Minimal navicular drop present through stance. She has early heel rise in stance phase of gait. Palpation:   
      Tender through midsubstance of the achilles and over the posterior heel. ROM:   
 Ankle DF: 10 ° L; 15 ° R Ankle PF: 40 ° B Ankle inversion: WNL Ankle eversion: WNL Great toe extension is mildly restricted Hip rotation is WNL Strength: Ankle DF: 5/5 Ankle PF: 4/5 L; 5/5 R Ankle inversion :4+/5 L; 5/5 R Ankle eversion: 5/5 B Hip extension: 4/5 B Hip abduction: 4/5 B Special Tests:   
      Slump and SLR testing are negative for provocation of symptoms. Mild discomfort with overpressure into ankle PF Neurological Screen: 
      Sensation is fully intact Functional Mobility:  
      Independent Balance:   
      Increased hip righting reactions with left single leg stance Body Structures Involved: 1. Nerves 2. Bones 3. Joints 4. Muscles 5. Ligaments Body Functions Affected: 1. Sensory/Pain 2. Neuromusculoskeletal 
3. Movement Related Activities and Participation Affected: 1. Learning and Applying Knowledge 2. General Tasks and Demands 3. Mobility 4. Self Care 5. Domestic Life 6. Interpersonal Interactions and Relationships 7. Community, Social and [place] CLINICAL PRESENTATION:  
CLINICAL DECISION MAKING:  
 Outcome Measure: Tool Used: Lower Extremity Functional Scale (LEFS) Score:  Initial: 37/80 Most Recent: X/80 (Date: -- ) Interpretation of Score: 20 questions each scored on a 5 point scale with 0 representing \"extreme difficulty or unable to perform\" and 4 representing \"no difficulty\". The lower the score, the greater the functional disability. 80/80 represents no disability. Minimal detectable change is 9 points. Score 80 79-63 62-48 47-32 31-16 15-1 0 Modifier CH CI CJ CK CL CM CN Medical Necessity:  
· Patient is expected to demonstrate progress in strength, range of motion, balance and coordination to increase independence with community mobility and return to prior level of function. Reason for Services/Other Comments: 
· Patient has demonstrated an improvement in functional level by independent performance of HEP. TREATMENT:  
(In addition to Assessment/Re-Assessment sessions the following treatments were rendered) THERAPEUTIC EXERCISE: (see below for minutes):  Exercises per grid below to improve mobility, strength, balance and coordination. Required minimal verbal and manual cues to promote proper body alignment, promote proper body posture and promote proper body mechanics. Progressed resistance, range, repetitions and complexity of movement as indicated. MANUAL THERAPY: (see below for minutes): Joint mobilization and Soft tissue mobilization was utilized and necessary because of the patient's restricted joint motion, painful spasm, loss of articular motion and restricted motion of soft tissue. MODALITIES: (see below for minutes): To decrease pain Date: 10/31/18  11/05/18 (visit 2) 11/07/18 (visit 3) 11/13/18 (visit 4) 11/14/18 (visit 5) 11/19/18 (visit 6) Modalities:        
        
        
        
Therapeutic Exercise: 20 min 30 min 30 min 30 min 30 min 15 min Stretching [id number] self ankle mobilization with knee drive to wall repeat repeat repeat repeat Bridge [id number] double leg Hip abduction 2x10 Calf raises Discussed progression of seated and standing double leg calf raises from [id number] to 3x8 reps Seated 15# 3x12; standing eccentric 4x8 with slow lower repeat Standing eccentric 4x8 from wedge, seated 50# on LAQ 4x8 Seated 75# 4x8 single leg with forefoot on half roll Seated 75# 3x115 single leg with forefoot on half roll Inversion/Eversion  Black tband 2x15 each repeat repeat repeat Black tband 3x15 in and ev Lateral band walks    Black clx x 3 laps Great toe flexion     3x20 green tband NuStep  5 min L4 repeat 5 min L4 6 min L$ 5 min L4 Proprioceptive Activities:        
        
        
        
Manual Therapy:  15 min 15 min 15 min 15 min 15 min Soft tissue mobilization Next visit To posterior calf, posterior tibialis repeat repeat Repeat; focusing on gastroc-soleus junction at mid calf repeat Therapeutic Activities: HEP: see 10/31/18 flow sheet for specifics. Educated on managing load of calf raises based on symptom response. [facility] Portal 
Treatment/Session Assessment: Treatment limited due to patient late arrival. No symptoms noted at end range of DF loading with seated calf raise. · Pain/ Symptoms: Initial:   3/10 \"I was able to go without the boot all weekend. I wore it just a little today. I'm still getting some swelling at the back of the heel, but it's not nearly as sensitive. \" Post Session:  No increase following today's treatment session. ·  
Compliance with Program/Exercises: Will assess as treatment progresses. · Recommendations/Intent for next treatment session: \"Next visit will focus on advancements to more challenging activities\". Total Treatment Duration: PT Patient Time In/Time Out Time In: 1500 Time Out: 1530 Feliz José DPT

## 2018-11-21 ENCOUNTER — HOSPITAL ENCOUNTER (OUTPATIENT)
Dept: PHYSICAL THERAPY | Age: 48
Discharge: HOME | End: 2018-11-21
Payer: COMMERCIAL

## 2018-11-21 PROCEDURE — 97110 THERAPEUTIC EXERCISES: CPT

## 2018-11-21 PROCEDURE — 97140 MANUAL THERAPY 1/> REGIONS: CPT

## 2018-11-21 NOTE — THERAPY EVALUATION
Zachary Fuentes : 1970 [id number] [facility],[address] @ [address] [place] [address]. [phone]   [phone] OUTPATIENT PHYSICAL THERAPY:Daily Note 2018 ICD-10: Treatment Diagnosis: Pain in left ankle and joints of left foot (M25.572); Difficulty in walking, not elsewhere classified (R26.2) Precautions/Allergies:  
Patient has no known allergies. Fall Risk Score: 0 (? 5 = High Risk) MD Orders: evaluate and treat MEDICAL/REFERRING DIAGNOSIS: 
Pain in left ankle and joints of left foot [M25.572] DATE OF ONSET: chronic history with worsening over the past 6 weeks REFERRING PHYSICIAN: Eleonora Allen MD 
RETURN PHYSICIAN APPOINTMENT: 18 INITIAL ASSESSMENT:  Ms. Navya Gallardo presents to therapy with left ankle pain secondary to midsubstance achilles tendinosis with braden's deformity. She has restricted calf flexibility. She has weakness through the calf musculature, intrinsic foot musculature, and poor motor control of the hip musculature. Symptoms are moderately reactive. Skilled therapy is required to return to prior level of function. PROBLEM LIST (Impacting functional limitations): 1. Decreased Strength 2. Decreased ADL/Functional Activities 3. Decreased Ambulation Ability/Technique 4. Decreased Balance 5. Increased Pain 6. Decreased Activity Tolerance 7. Decreased Flexibility/Joint Mobility INTERVENTIONS PLANNED: 
1. Family Education 2. Gait Training 3. Home Exercise Program (HEP) 4. Manual Therapy 5. Neuromuscular Re-education/Strengthening 6. Range of Motion (ROM) 7. Therapeutic Activites 8. Therapeutic Exercise/Strengthening 9. modalities TREATMENT PLAN: 
Effective Dates: 10/31/2018 TO 2019 (90 days). Frequency/Duration: 2 times a week for 90 Days GOALS: (Goals have been discussed and agreed upon with patient.) Short-Term Functional Goals: Time Frame: 2 weeks 1. Patient will be independent with HEP. 2. Patient will report pain <2/10 with daily activity. Discharge Goals: Time Frame: 6 weeks 1. Patient will report no pain with daily activity to perform work duties without use of boot. 2. Patient will be able to perform 10 single leg heel raises through full ROM to restore functional calf strength. 3. Patient will improve ankle DF to 15 ° to decrease strain on the achilles with daily activity such as stair descent. Rehabilitation Potential For Stated Goals: Excellent Regarding Consuelomarie Verdugo therapy, I certify that the treatment plan above will be carried out by a therapist or under their direction. Thank you for this referral, Rajiv Brown DPT HISTORY:  
History of Present Injury/Illness (Reason for Referral): 
Patient presents to therapy with primary complaint of left posterior heel pain. She notes a chronic history of heel pain that has been worsening over the past 6 weeks. She has recently had excruciating pain in the morning, pain with end range movements into passive plantar flexion, and pain with general walking activities. 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 Since her initial MD consult, she has begun to use a walking boot during the day and a compressive sock in the evening. This has significantly been helping. She has also begun to take meloxicam which has also been helping. Past Medical History/Comorbidities:  
Ms. Julio Cesar Trinh  has a past medical history of Morbid obesity ([address] Nausea & vomiting, Psychiatric disorder, and Unspecified adverse effect of anesthesia. Ms. Julio Cesar Trinh  has a past surgical history that includes hx  section; hx other surgical (, ); and LIPOMA EXCISION MID BACK (N/A, 2/3/2014). Social History/Living Environment:  
  Patient lives at home with her family. Prior Level of Function/Work/Activity: 
Patient is a . Dominant Side:  
      RIGHT Current Medications: Current Outpatient Medications:  
  ondansetron (ZOFRAN ODT) 4 mg disintegrating tablet, Take 4 mg by mouth every eight (8) hours as needed for Nausea., Disp: , Rfl:  
  Desvenlafaxine (PRISTIQ) 100 mg Tb24, Take 1 Tab by mouth nightly., Disp: , Rfl:  
  multivitamin (ONE A DAY) tablet, Take 1 Tab by mouth daily. , Disp: , Rfl:  
  cholecalciferol, vitamin D3, (VITAMIN D3) 2,000 unit tab, Take 1 Tab by mouth daily. , Disp: , Rfl:   
Date Last Reviewed:  11/21/2018 EXAMINATION:  
Observation/Orthostatic Postural Assessment:   
      Magui's deformity noted at the posterior heel. Mild rearfoot eversion present from initial contact to midstance. Minimal navicular drop present through stance. She has early heel rise in stance phase of gait. Palpation:   
      Tender through midsubstance of the achilles and over the posterior heel. ROM:   
 Ankle DF: 10 ° L; 15 ° R Ankle PF: 40 ° B Ankle inversion: WNL Ankle eversion: WNL Great toe extension is mildly restricted Hip rotation is WNL Strength: Ankle DF: 5/5 Ankle PF: 4/5 L; 5/5 R Ankle inversion :4+/5 L; 5/5 R Ankle eversion: 5/5 B Hip extension: 4/5 B Hip abduction: 4/5 B Special Tests:   
      Slump and SLR testing are negative for provocation of symptoms. Mild discomfort with overpressure into ankle PF Neurological Screen: 
      Sensation is fully intact Functional Mobility:  
      Independent Balance:   
      Increased hip righting reactions with left single leg stance Body Structures Involved: 1. Nerves 2. Bones 3. Joints 4. Muscles 5. Ligaments Body Functions Affected: 1. Sensory/Pain 2. Neuromusculoskeletal 
3. Movement Related Activities and Participation Affected: 1. Learning and Applying Knowledge 2. General Tasks and Demands 3. Mobility 4. Self Care 5. Domestic Life 6. Interpersonal Interactions and Relationships 7. Community, Social and [place] CLINICAL PRESENTATION:  
CLINICAL DECISION MAKING:  
 Outcome Measure: Tool Used: Lower Extremity Functional Scale (LEFS) Score:  Initial: 37/80 Most Recent: X/80 (Date: -- ) Interpretation of Score: 20 questions each scored on a 5 point scale with 0 representing \"extreme difficulty or unable to perform\" and 4 representing \"no difficulty\". The lower the score, the greater the functional disability. 80/80 represents no disability. Minimal detectable change is 9 points. Score 80 79-63 62-48 47-32 31-16 15-1 0 Modifier CH CI CJ CK CL CM CN Medical Necessity:  
· Patient is expected to demonstrate progress in strength, range of motion, balance and coordination to increase independence with community mobility and return to prior level of function. Reason for Services/Other Comments: 
· Patient has demonstrated an improvement in functional level by independent performance of HEP. TREATMENT:  
(In addition to Assessment/Re-Assessment sessions the following treatments were rendered) THERAPEUTIC EXERCISE: (see below for minutes):  Exercises per grid below to improve mobility, strength, balance and coordination. Required minimal verbal and manual cues to promote proper body alignment, promote proper body posture and promote proper body mechanics. Progressed resistance, range, repetitions and complexity of movement as indicated. MANUAL THERAPY: (see below for minutes): Joint mobilization and Soft tissue mobilization was utilized and necessary because of the patient's restricted joint motion, painful spasm, loss of articular motion and restricted motion of soft tissue. MODALITIES: (see below for minutes): To decrease pain Date: 10/31/18  11/05/18 (visit 2) 11/07/18 (visit 3) 11/13/18 (visit 4) 11/14/18 (visit 5) 11/19/18 (visit 6) 11/21/18 (visit 7) Modalities:         
         
         
         
Therapeutic Exercise: 20 min 30 min 30 min 30 min 30 min 15 min 30 min Stretching [id number] self ankle mobilization with knee drive to wall repeat repeat repeat repeat  [id number]; with knee rock 15x Bridge [id number] double leg Hip abduction 2x10 Calf raises Discussed progression of seated and standing double leg calf raises from [id number] to 3x8 reps Seated 15# 3x12; standing eccentric 4x8 with slow lower repeat Standing eccentric 4x8 from wedge, seated 50# on LAQ 4x8 Seated 75# 4x8 single leg with forefoot on half roll Seated 75# 3x115 single leg with forefoot on half roll Seated 87# 3x10; toe raises 3x15 Inversion/Eversion  Black tband 2x15 each repeat repeat repeat Black tband 3x15 in and ev Black tband 3x15 in and ev Lateral band walks    Black clx x 3 laps Great toe flexion     3x20 green tband  3x20 green tband NuStep  5 min L4 repeat 5 min L4 6 min L$ 5 min L4 5 min L4 Proprioceptive Activities:         
         
         
         
Manual Therapy:  15 min 15 min 15 min 15 min 15 min 15 min Soft tissue mobilization Next visit To posterior calf, posterior tibialis repeat repeat Repeat; focusing on gastroc-soleus junction at mid calf repeat repeat Therapeutic Activities: HEP: see 10/31/18 flow sheet for specifics. Educated on managing load of calf raises based on symptom response. [employer] Portal 
Treatment/Session Assessment: Added single leg standing heel raise to HEP to further progress calf strengthening. · Pain/ Symptoms: Initial:   3/10 \"I'm using the boot less and less. It's not swelling nearly as much and it's not as stiff in the morning. \" Post Session:  No increase following today's treatment session. ·  
Compliance with Program/Exercises: Will assess as treatment progresses. · Recommendations/Intent for next treatment session: \"Next visit will focus on advancements to more challenging activities\". Total Treatment Duration: PT Patient Time In/Time Out Time In: 6660 Time Out: 1100 Kenny Ortiz DPT

## 2018-11-26 ENCOUNTER — HOSPITAL ENCOUNTER (OUTPATIENT)
Dept: PHYSICAL THERAPY | Age: 48
Discharge: HOME | End: 2018-11-26
Payer: COMMERCIAL

## 2018-11-26 PROCEDURE — 97022 WHIRLPOOL THERAPY: CPT

## 2018-11-26 PROCEDURE — 97110 THERAPEUTIC EXERCISES: CPT

## 2018-11-26 PROCEDURE — 97140 MANUAL THERAPY 1/> REGIONS: CPT

## 2018-11-26 NOTE — THERAPY EVALUATION
Anne Whiting : 1970 [address] @ [address] [place] [address]. [phone]   [phone] OUTPATIENT PHYSICAL THERAPY:Daily Note and Progress Report 2018 ICD-10: Treatment Diagnosis: Pain in left ankle and joints of left foot (M25.572); Difficulty in walking, not elsewhere classified (R26.2) Precautions/Allergies:  
Patient has no known allergies. Fall Risk Score: 0 (? 5 = High Risk) MD Orders: evaluate and treat MEDICAL/REFERRING DIAGNOSIS: 
Pain in left ankle and joints of left foot [M25.572] DATE OF ONSET: chronic history with worsening over the past 6 weeks REFERRING PHYSICIAN: Salas Moss MD 
RETURN PHYSICIAN APPOINTMENT: 18 INITIAL ASSESSMENT:  Ms. Chirag Rodriguez presents to therapy with left ankle pain secondary to midsubstance achilles tendinosis with braden's deformity. She has restricted calf flexibility. She has weakness through the calf musculature, intrinsic foot musculature, and poor motor control of the hip musculature. Symptoms are moderately reactive. Skilled therapy is required to return to prior level of function. PROBLEM LIST (Impacting functional limitations): 1. Decreased Strength 2. Decreased ADL/Functional Activities 3. Decreased Ambulation Ability/Technique 4. Decreased Balance 5. Increased Pain 6. Decreased Activity Tolerance 7. Decreased Flexibility/Joint Mobility INTERVENTIONS PLANNED: 
1. Family Education 2. Gait Training 3. Home Exercise Program (HEP) 4. Manual Therapy 5. Neuromuscular Re-education/Strengthening 6. Range of Motion (ROM) 7. Therapeutic Activites 8. Therapeutic Exercise/Strengthening 9. modalities TREATMENT PLAN: 
Effective Dates: 10/31/2018 TO 2019 (90 days). Frequency/Duration: 2 times a week for 90 Days GOALS: (Goals have been discussed and agreed upon with patient.) Short-Term Functional Goals: Time Frame: 2 weeks Medications: 1. Patient will be independent with HEP. MET 2. Patient will report pain <2/10 with daily activity. PROGRESSING Discharge Goals: Time Frame: 6 weeks 1. Patient will report no pain with daily activity to perform work duties without use of boot. NOT MET 2. Patient will be able to perform 10 single leg heel raises through full ROM to restore functional calf strength. NOT MET 3. Patient will improve ankle DF to 15 ° to decrease strain on the achilles with daily activity such as stair descent. NOT MET Rehabilitation Potential For Stated Goals: Excellent Regarding Consuelo Kartik therapy, I certify that the treatment plan above will be carried out by a therapist or under their direction. Thank you for this referral, Feliz José DPT HISTORY:  
History of Present Injury/Illness (Reason for Referral): 
Patient presents to therapy with primary complaint of left posterior heel pain. She notes a chronic history of heel pain that has been worsening over the past 6 weeks. She has recently had excruciating pain in the morning, pain with end range movements into passive plantar flexion, and pain with general walking activities. 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 Since her initial MD consult, she has begun to use a walking boot during the day and a compressive sock in the evening. This has significantly been helping. She has also begun to take meloxicam which has also been helping. Past Medical History/Comorbidities:  
Ms. Antonette Griffith  has a past medical history of Morbid obesity ([address] Nausea & vomiting, Psychiatric disorder, and Unspecified adverse effect of anesthesia. Ms. Antonette Griffith  has a past surgical history that includes hx  section; hx other surgical (, ); and LIPOMA EXCISION MID BACK (N/A, 2/3/2014). Social History/Living Environment:  
  Patient lives at home with her family. Prior Level of Function/Work/Activity: Patient is a . Dominant Side:  
      RIGHT Current Medications:   
Current Outpatient Medications:  
  ondansetron (ZOFRAN ODT) 4 mg disintegrating tablet, Take 4 mg by mouth every eight (8) hours as needed for Nausea., Disp: , Rfl:  
  Desvenlafaxine (PRISTIQ) 100 mg Tb24, Take 1 Tab by mouth nightly., Disp: , Rfl:  
  multivitamin (ONE A DAY) tablet, Take 1 Tab by mouth daily. , Disp: , Rfl:  
  cholecalciferol, vitamin D3, (VITAMIN D3) 2,000 unit tab, Take 1 Tab by mouth daily. , Disp: , Rfl:   
Date Last Reviewed:  11/26/2018 EXAMINATION:  
Observation/Orthostatic Postural Assessment:   
      Magui's deformity noted at the posterior heel. Mild rearfoot eversion present from initial contact to midstance. Minimal navicular drop present through stance. She has early heel rise in stance phase of gait. Palpation:   
      Tender through midsubstance of the achilles and over the posterior heel. ROM:   
 Ankle DF: 10 ° L; 15 ° R Ankle PF: 40 ° B Ankle inversion: WNL Ankle eversion: WNL Great toe extension is mildly restricted Hip rotation is WNL 
 11/26/18 update: Ankle DF: 12 ° L Great toe extension is full Strength: Ankle DF: 5/5 Ankle PF: 4/5 L; 5/5 R Ankle inversion :4+/5 L; 5/5 R Ankle eversion: 5/5 B Hip extension: 4/5 B Hip abduction: 4/5 B Ankle PF: Able to perform seated heel rasie at 75#; performing eccentric heel raises standing Special Tests:   
      Slump and SLR testing are negative for provocation of symptoms. Mild discomfort with overpressure into ankle PF Neurological Screen: 
      Sensation is fully intact Functional Mobility:  
      Independent Balance:   
      Increased hip righting reactions with left single leg stance Body Structures Involved: 1. Nerves 2. Bones 3. Joints 4. Muscles 5. Ligaments Body Functions Affected: 1. Sensory/Pain 2. Neuromusculoskeletal 
3. Movement Related Activities and Participation Affected: 1. Learning and Applying Knowledge 2. General Tasks and Demands 3. Mobility 4. Self Care 5. Domestic Life 6. Interpersonal Interactions and Relationships 7. Community, Social and [place] CLINICAL PRESENTATION:  
CLINICAL DECISION MAKING:  
Outcome Measure: Tool Used: Lower Extremity Functional Scale (LEFS) Score:  Initial: 37/80 Most Recent: X/80 (Date: -- ) Interpretation of Score: 20 questions each scored on a 5 point scale with 0 representing \"extreme difficulty or unable to perform\" and 4 representing \"no difficulty\". The lower the score, the greater the functional disability. 80/80 represents no disability. Minimal detectable change is 9 points. Score 80 79-63 62-48 47-32 31-16 15-1 0 Modifier CH CI CJ CK CL CM CN Medical Necessity:  
· Patient is expected to demonstrate progress in strength, range of motion, balance and coordination to increase independence with community mobility and return to prior level of function. Reason for Services/Other Comments: 
· Patient has demonstrated an improvement in functional level by independent performance of HEP. TREATMENT:  
(In addition to Assessment/Re-Assessment sessions the following treatments were rendered) THERAPEUTIC EXERCISE: (see below for minutes):  Exercises per grid below to improve mobility, strength, balance and coordination. Required minimal verbal and manual cues to promote proper body alignment, promote proper body posture and promote proper body mechanics. Progressed resistance, range, repetitions and complexity of movement as indicated. MANUAL THERAPY: (see below for minutes): Joint mobilization and Soft tissue mobilization was utilized and necessary because of the patient's restricted joint motion, painful spasm, loss of articular motion and restricted motion of soft tissue. MODALITIES: (see below for minutes): To decrease pain Date: 10/31/18  11/05/18 (visit 2) 11/07/18 (visit 3) 11/13/18 (visit 4) 11/14/18 (visit 5) 11/19/18 (visit 6) 11/21/18 (visit 7) 11/26/18 (visit 8) progress note Modalities:        10 min In cold whirlpool for swelling resolution Therapeutic Exercise: 20 min 30 min 30 min 30 min 30 min 15 min 30 min 30 min Stretching [id number] self ankle mobilization with knee drive to wall repeat repeat repeat repeat  [id number]; with knee rock 15x repeat Bridge [id number] double leg Hip abduction 2x10 Calf raises Discussed progression of seated and standing double leg calf raises from [id number] to 3x8 reps Seated 15# 3x12; standing eccentric 4x8 with slow lower repeat Standing eccentric 4x8 from wedge, seated 50# on LAQ 4x8 Seated 75# 4x8 single leg with forefoot on half roll Seated 75# 3x115 single leg with forefoot on half roll Seated 87# 3x10; toe raises 3x15 Seated 75# x10; 87# 3x10; SL from incline attempted, SL from floor 2x10, ecc from incline 2x10 Inversion/Eversion  Black tband 2x15 each repeat repeat repeat Black tband 3x15 in and ev Black tband 3x15 in and ev repeat Lateral band walks    Black clx x 3 laps Great toe flexion     3x20 green tband  3x20 green tband Squat        Single leg from 6\" step 3x10 NuStep  5 min L4 repeat 5 min L4 6 min L$ 5 min L4 5 min L4 5 min L4 Proprioceptive Activities:          
          
          
          
Manual Therapy:  15 min 15 min 15 min 15 min 15 min 15 min 15 min Soft tissue mobilization Next visit To posterior calf, posterior tibialis repeat repeat Repeat; focusing on gastroc-soleus junction at mid calf repeat repeat repeat Therapeutic Activities: HEP: see 10/31/18 flow sheet for specifics. Educated on managing load of calf raises based on symptom response.   [employer] Portal 
Treatment/Session Assessment: Advised going without boot for the morning, and using the boot in the evening when soreness increases. Continues to have pain with single leg calf raise from incline, but able to perform on level ground. · Pain/ Symptoms: Initial:   3/10 Notes continued decrease use of boot for daily activity. Did use it this morning, but came out of it this afternoon. Post Session:  No increase following today's treatment session. ·  
Compliance with Program/Exercises: Will assess as treatment progresses. · Recommendations/Intent for next treatment session: \"Next visit will focus on advancements to more challenging activities\". Total Treatment Duration: PT Patient Time In/Time Out Time In: 0154 Time Out: 1016 Bo Junior DPT

## 2018-11-28 ENCOUNTER — HOSPITAL ENCOUNTER (OUTPATIENT)
Dept: PHYSICAL THERAPY | Age: 48
Discharge: HOME | End: 2018-11-28
Payer: COMMERCIAL

## 2018-11-28 PROCEDURE — 97140 MANUAL THERAPY 1/> REGIONS: CPT

## 2018-11-28 PROCEDURE — 97110 THERAPEUTIC EXERCISES: CPT

## 2018-11-28 NOTE — THERAPY EVALUATION
Clarisa Becki : 1970 [address] [place] [address],[address] @ [address] [address] [place] [address]. [phone]   [phone] OUTPATIENT PHYSICAL THERAPY:Daily Note and Progress Report 2018 ICD-10: Treatment Diagnosis: Pain in left ankle and joints of left foot (M25.572); Difficulty in walking, not elsewhere classified (R26.2) Precautions/Allergies:  
Patient has no known allergies. Fall Risk Score: 0 (? 5 = High Risk) MD Orders: evaluate and treat MEDICAL/REFERRING DIAGNOSIS: 
Pain in left ankle and joints of left foot [M25.572] DATE OF ONSET: chronic history with worsening over the past 6 weeks REFERRING PHYSICIAN: Theron Cranker, MD 
RETURN PHYSICIAN APPOINTMENT: 18 INITIAL ASSESSMENT:  Ms. Jolynn Thornton presents to therapy with left ankle pain secondary to midsubstance achilles tendinosis with braden's deformity. She has restricted calf flexibility. She has weakness through the calf musculature, intrinsic foot musculature, and poor motor control of the hip musculature. Symptoms are moderately reactive. Skilled therapy is required to return to prior level of function. PROBLEM LIST (Impacting functional limitations): 1. Decreased Strength 2. Decreased ADL/Functional Activities 3. Decreased Ambulation Ability/Technique 4. Decreased Balance 5. Increased Pain 6. Decreased Activity Tolerance 7. Decreased Flexibility/Joint Mobility INTERVENTIONS PLANNED: 
1. Family Education 2. Gait Training 3. Home Exercise Program (HEP) 4. Manual Therapy 5. Neuromuscular Re-education/Strengthening 6. Range of Motion (ROM) 7. Therapeutic Activites 8. Therapeutic Exercise/Strengthening 9. modalities TREATMENT PLAN: 
Effective Dates: 10/31/2018 TO 2019 (90 days). Frequency/Duration: 2 times a week for 90 Days GOALS: (Goals have been discussed and agreed upon with patient.) Short-Term Functional Goals: Time Frame: 2 weeks 1. Patient will be independent with HEP. MET 2. Patient will report pain <2/10 with daily activity. PROGRESSING Discharge Goals: Time Frame: 6 weeks 1. Patient will report no pain with daily activity to perform work duties without use of boot. NOT MET 2. Patient will be able to perform 10 single leg heel raises through full ROM to restore functional calf strength. NOT MET 3. Patient will improve ankle DF to 15 ° to decrease strain on the achilles with daily activity such as stair descent. NOT MET Rehabilitation Potential For Stated Goals: Excellent Regarding Consuelo Verdugo therapy, I certify that the treatment plan above will be carried out by a therapist or under their direction. Thank you for this referral, Kenny Ortiz DPT HISTORY:  
History of Present Injury/Illness (Reason for Referral): 
Patient presents to therapy with primary complaint of left posterior heel pain. She notes a chronic history of heel pain that has been worsening over the past 6 weeks. She has recently had excruciating pain in the morning, pain with end range movements into passive plantar flexion, and pain with general walking activities. 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 Since her initial MD consult, she has begun to use a walking boot during the day and a compressive sock in the evening. This has significantly been helping. She has also begun to take meloxicam which has also been helping. Past Medical History/Comorbidities:  
Ms. Jesse Snider  has a past medical history of Morbid obesity ([facility] [address] Nausea & vomiting, Psychiatric disorder, and Unspecified adverse effect of anesthesia. Ms. Jesse Snider  has a past surgical history that includes hx  section; hx other surgical (, ); and LIPOMA EXCISION MID BACK (N/A, 2/3/2014). Social History/Living Environment:  
  Patient lives at home with her family. Prior Level of Function/Work/Activity: Patient is a . Dominant Side:  
      RIGHT Current Medications:   
Current Outpatient Medications:  
  ondansetron (ZOFRAN ODT) 4 mg disintegrating tablet, Take 4 mg by mouth every eight (8) hours as needed for Nausea., Disp: , Rfl:  
  Desvenlafaxine (PRISTIQ) 100 mg Tb24, Take 1 Tab by mouth nightly., Disp: , Rfl:  
  multivitamin (ONE A DAY) tablet, Take 1 Tab by mouth daily. , Disp: , Rfl:  
  cholecalciferol, vitamin D3, (VITAMIN D3) 2,000 unit tab, Take 1 Tab by mouth daily. , Disp: , Rfl:   
Date Last Reviewed:  11/28/2018 EXAMINATION:  
Observation/Orthostatic Postural Assessment:   
      Magui's deformity noted at the posterior heel. Mild rearfoot eversion present from initial contact to midstance. Minimal navicular drop present through stance. She has early heel rise in stance phase of gait. Palpation:   
      Tender through midsubstance of the achilles and over the posterior heel. ROM:   
 Ankle DF: 10 ° L; 15 ° R Ankle PF: 40 ° B Ankle inversion: WNL Ankle eversion: WNL Great toe extension is mildly restricted Hip rotation is WNL 
 11/26/18 update: Ankle DF: 12 ° L Great toe extension is full Strength: Ankle DF: 5/5 Ankle PF: 4/5 L; 5/5 R Ankle inversion :4+/5 L; 5/5 R Ankle eversion: 5/5 B Hip extension: 4/5 B Hip abduction: 4/5 B Ankle PF: Able to perform seated heel rasie at 75#; performing eccentric heel raises standing Special Tests:   
      Slump and SLR testing are negative for provocation of symptoms. Mild discomfort with overpressure into ankle PF Neurological Screen: 
      Sensation is fully intact Functional Mobility:  
      Independent Balance:   
      Increased hip righting reactions with left single leg stance Body Structures Involved: 1. Nerves 2. Bones 3. Joints 4. Muscles 5. Ligaments Body Functions Affected: 1. Sensory/Pain 2. Neuromusculoskeletal 
3. Movement Related Activities and Participation Affected: 1. Learning and Applying Knowledge 2. General Tasks and Demands 3. Mobility 4. Self Care 5. Domestic Life 6. Interpersonal Interactions and Relationships 7. Community, Social and [place] CLINICAL PRESENTATION:  
CLINICAL DECISION MAKING:  
Outcome Measure: Tool Used: Lower Extremity Functional Scale (LEFS) Score:  Initial: 37/80 Most Recent: X/80 (Date: -- ) Interpretation of Score: 20 questions each scored on a 5 point scale with 0 representing \"extreme difficulty or unable to perform\" and 4 representing \"no difficulty\". The lower the score, the greater the functional disability. 80/80 represents no disability. Minimal detectable change is 9 points. Score 80 79-63 62-48 47-32 31-16 15-1 0 Modifier CH CI CJ CK CL CM CN Medical Necessity:  
· Patient is expected to demonstrate progress in strength, range of motion, balance and coordination to increase independence with community mobility and return to prior level of function. Reason for Services/Other Comments: 
· Patient has demonstrated an improvement in functional level by independent performance of HEP. TREATMENT:  
(In addition to Assessment/Re-Assessment sessions the following treatments were rendered) THERAPEUTIC EXERCISE: (see below for minutes):  Exercises per grid below to improve mobility, strength, balance and coordination. Required minimal verbal and manual cues to promote proper body alignment, promote proper body posture and promote proper body mechanics. Progressed resistance, range, repetitions and complexity of movement as indicated. MANUAL THERAPY: (see below for minutes): Joint mobilization and Soft tissue mobilization was utilized and necessary because of the patient's restricted joint motion, painful spasm, loss of articular motion and restricted motion of soft tissue. MODALITIES: (see below for minutes): To decrease pain Date: 10/31/18  11/05/18 (visit 2) 11/07/18 (visit 3) 11/13/18 (visit 4) 11/14/18 (visit 5) 11/19/18 (visit 6) 11/21/18 (visit 7) 11/26/18 (visit 8) progress note 11/28/18 (visit 9) Modalities:        10 min In cold whirlpool for swelling resolution Therapeutic Exercise: 20 min 30 min 30 min 30 min 30 min 15 min 30 min 30 min 30 min Stretching [id number] self ankle mobilization with knee drive to wall repeat repeat repeat repeat  [id number]; with knee rock 15x repeat [id number] self MWM, standing gastroc stretch 2x1 min L1 Bridge [id number] double leg Hip abduction 2x10 Calf raises Discussed progression of seated and standing double leg calf raises from [id number] to 3x8 reps Seated 15# 3x12; standing eccentric 4x8 with slow lower repeat Standing eccentric 4x8 from wedge, seated 50# on LAQ 4x8 Seated 75# 4x8 single leg with forefoot on half roll Seated 75# 3x115 single leg with forefoot on half roll Seated 87# 3x10; toe raises 3x15 Seated 75# x10; 87# 3x10; SL from incline attempted, SL from floor 2x10, ecc from incline 2x10 repeat Inversion/Eversion  Black tband 2x15 each repeat repeat repeat Black tband 3x15 in and ev Black tband 3x15 in and ev repeat repeat Lateral band walks    Black clx x 3 laps Great toe flexion     3x20 green tband  3x20 green tband Squat        Single leg from 6\" step 3x10 repeat NuStep  5 min L4 repeat 5 min L4 6 min L$ 5 min L4 5 min L4 5 min L4 repeat Proprioceptive Activities:           
           
           
           
Manual Therapy:  15 min 15 min 15 min 15 min 15 min 15 min 15 min 15 min Soft tissue mobilization Next visit To posterior calf, posterior tibialis repeat repeat Repeat; focusing on gastroc-soleus junction at mid calf repeat repeat repeat repeat Therapeutic Activities: HEP: see 10/31/18 flow sheet for specifics. Educated on managing load of calf raises based on symptom response.   [employer] Portal 
 Treatment/Session Assessment: Continues to lack strength and symptoms remain too irritable to perform SL calf raises from incline. Gait pattern is symmetric with shoes on level surfaces. · Pain/ Symptoms: Initial:   3/10 \"I've been just wearing the boot in the afternoon now. It seems to work pretty well that way. It gets tired by the end of the day, but it's feeling much better. \" Post Session:  No increase following today's treatment session. ·  
Compliance with Program/Exercises: Will assess as treatment progresses. · Recommendations/Intent for next treatment session: \"Next visit will focus on advancements to more challenging activities\". Total Treatment Duration: PT Patient Time In/Time Out Time In: 2327 Time Out: 1530 Lo Harrell DPT

## 2018-12-18 ENCOUNTER — HOSPITAL ENCOUNTER (OUTPATIENT)
Dept: PHYSICAL THERAPY | Age: 48
Discharge: HOME | End: 2018-12-18
Payer: COMMERCIAL

## 2018-12-18 PROCEDURE — 97140 MANUAL THERAPY 1/> REGIONS: CPT

## 2018-12-18 PROCEDURE — 97110 THERAPEUTIC EXERCISES: CPT

## 2018-12-18 NOTE — THERAPY EVALUATION
[address]  : 1970 Lash[address]  [address] [place] [address].  [phone]   [phone]        OUTPATIENT PHYSICAL THERAPY:Daily Note 2018         ICD-10: Treatment Diagnosis: Pain in left ankle and joints of left foot (M25.572); Difficulty in walking, not elsewhere classified (R26.2)  Precautions/Allergies:   Patient has no known allergies. Fall Risk Score: 0 (? 5 = High Risk)  MD Orders: evaluate and treat MEDICAL/REFERRING DIAGNOSIS:  Pain in left ankle and joints of left foot [M25.572]   DATE OF ONSET: chronic history with worsening over the past 6 weeks  REFERRING PHYSICIAN: Antelmo Pan MD  RETURN PHYSICIAN APPOINTMENT: 18     INITIAL ASSESSMENT:  Ms. Hemalatha Waldron presents to therapy with left ankle pain secondary to midsubstance achilles tendinosis with braden's deformity. She has restricted calf flexibility. She has weakness through the calf musculature, intrinsic foot musculature, and poor motor control of the hip musculature. Symptoms are moderately reactive. Skilled therapy is required to return to prior level of function. PROBLEM LIST (Impacting functional limitations):  1. Decreased Strength  2. Decreased ADL/Functional Activities  3. Decreased Ambulation Ability/Technique  4. Decreased Balance  5. Increased Pain  6. Decreased Activity Tolerance  7. Decreased Flexibility/Joint Mobility INTERVENTIONS PLANNED:  1. Family Education  2. Gait Training  3. Home Exercise Program (HEP)  4. Manual Therapy  5. Neuromuscular Re-education/Strengthening  6. Range of Motion (ROM)  7. Therapeutic Activites  8. Therapeutic Exercise/Strengthening  9. modalities    TREATMENT PLAN:  Effective Dates: 10/31/2018 TO 2019 (90 days). Frequency/Duration: 2 times a week for 90 Days  GOALS: (Goals have been discussed and agreed upon with patient.)  Short-Term Functional Goals: Time Frame: 2 weeks  1. Patient will be independent with HEP. MET  2. Patient will report pain <2/10 with daily activity. PROGRESSING   Discharge Goals: Time Frame: 6 weeks  1. Patient will report no pain with daily activity to perform work duties without use of boot. NOT MET  2. Patient will be able to perform 10 single leg heel raises through full ROM to restore functional calf strength. NOT MET   3. Patient will improve ankle DF to 15 ° to decrease strain on the achilles with daily activity such as stair descent. NOT MET  Rehabilitation Potential For Stated Goals: Excellent  Regarding Sonia Barfield's therapy, I certify that the treatment plan above will be carried out by a therapist or under their direction. Thank you for this referral,  Mellody Hamman, DPT                     HISTORY:   History of Present Injury/Illness (Reason for Referral):  Patient presents to therapy with primary complaint of left posterior heel pain. She notes a chronic history of heel pain that has been worsening over the past 6 weeks. She has recently had excruciating pain in the morning, pain with end range movements into passive plantar flexion, and pain with general walking activities. 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 Since her initial MD consult, she has begun to use a walking boot during the day and a compressive sock in the evening. This has significantly been helping. She has also begun to take meloxicam which has also been helping. Past Medical History/Comorbidities:   Ms. Jeremy Luna  has a past medical history of Morbid obesity ([address] Nausea & vomiting, Psychiatric disorder, and Unspecified adverse effect of anesthesia. Ms. Jeremy Luna  has a past surgical history that includes hx  section; hx other surgical (, ); and LIPOMA EXCISION MID BACK (N/A, 2/3/2014). Social History/Living Environment:     Patient lives at home with her family. Prior Level of Function/Work/Activity:  Patient is a .   Dominant Side:         RIGHT  Current Medications:    Current Outpatient Medications:     ondansetron (ZOFRAN ODT) 4 mg disintegrating tablet, Take 4 mg by mouth every eight (8) hours as needed for Nausea., Disp: , Rfl:     Desvenlafaxine (PRISTIQ) 100 mg Tb24, Take 1 Tab by mouth nightly., Disp: , Rfl:     multivitamin (ONE A DAY) tablet, Take 1 Tab by mouth daily. , Disp: , Rfl:     cholecalciferol, vitamin D3, (VITAMIN D3) 2,000 unit tab, Take 1 Tab by mouth daily. , Disp: , Rfl:    Date Last Reviewed:  12/18/2018   EXAMINATION:   Observation/Orthostatic Postural Assessment:          Magui's deformity noted at the posterior heel. Mild rearfoot eversion present from initial contact to midstance. Minimal navicular drop present through stance. She has early heel rise in stance phase of gait. Palpation:          Tender through midsubstance of the achilles and over the posterior heel. ROM:     Ankle DF: 10 ° L; 15 ° R  Ankle PF: 40 ° B  Ankle inversion: WNL  Ankle eversion: WNL  Great toe extension is mildly restricted  Hip rotation is WNL   11/26/18 update: Ankle DF: 12 ° L  Great toe extension is full   Strength: Ankle DF: 5/5   Ankle PF: 4/5 L; 5/5 R  Ankle inversion :4+/5 L; 5/5 R  Ankle eversion: 5/5 B  Hip extension: 4/5 B  Hip abduction: 4/5 B   Ankle PF: Able to perform seated heel rasie at 75#; performing eccentric heel raises standing   Special Tests:          Slump and SLR testing are negative for provocation of symptoms. Mild discomfort with overpressure into ankle PF  Neurological Screen:        Sensation is fully intact  Functional Mobility:         Independent  Balance:          Increased hip righting reactions with left single leg stance   Body Structures Involved:  1. Nerves  2. Bones  3. Joints  4. Muscles  5. Ligaments Body Functions Affected:  1. Sensory/Pain  2. Neuromusculoskeletal  3. Movement Related Activities and Participation Affected:  1. Learning and Applying Knowledge  2.  General Tasks and Demands  3. Mobility  4. Self Care  5. Domestic Life  6. Interpersonal Interactions and Relationships  7. Community, Social and Civic Life   CLINICAL PRESENTATION:   CLINICAL DECISION MAKING:   Outcome Measure: Tool Used: Lower Extremity Functional Scale (LEFS)  Score:  Initial: 37/80 Most Recent: X/80 (Date: -- )   Interpretation of Score: 20 questions each scored on a 5 point scale with 0 representing \"extreme difficulty or unable to perform\" and 4 representing \"no difficulty\". The lower the score, the greater the functional disability. 80/80 represents no disability. Minimal detectable change is 9 points. Score 80 79-63 62-48 47-32 31-16 15-1 0   Modifier CH CI CJ CK CL CM CN       Medical Necessity:   · Patient is expected to demonstrate progress in strength, range of motion, balance and coordination to increase independence with community mobility and return to prior level of function. Reason for Services/Other Comments:  · Patient has demonstrated an improvement in functional level by independent performance of HEP. TREATMENT:   (In addition to Assessment/Re-Assessment sessions the following treatments were rendered)  THERAPEUTIC EXERCISE: (see below for minutes):  Exercises per grid below to improve mobility, strength, balance and coordination. Required minimal verbal and manual cues to promote proper body alignment, promote proper body posture and promote proper body mechanics. Progressed resistance, range, repetitions and complexity of movement as indicated. MANUAL THERAPY: (see below for minutes): Joint mobilization and Soft tissue mobilization was utilized and necessary because of the patient's restricted joint motion, painful spasm, loss of articular motion and restricted motion of soft tissue. MODALITIES: (see below for minutes):       To decrease pain     Date: 10/31/18  11/05/18 (visit 2) 11/07/18 (visit 3) 11/13/18 (visit 4) 11/14/18 (visit 5) 11/19/18 (visit 6) 11/21/18 (visit 7) 11/26/18 (visit 8) progress note 11/28/18 (visit 9) 12/18/18 (visit 10)   Modalities:        10 min             In cold whirlpool for swelling resolution                               Therapeutic Exercise: 20 min 30 min 30 min 30 min 30 min 15 min 30 min 30 min 30 min 30 min   Stretching [id number] self ankle mobilization with knee drive to wall repeat repeat repeat repeat  [id number]; with knee rock 15x repeat [id number] self MWM, standing gastroc stretch 2x1 min L1 [id number] self MWM   Bridge [id number] double leg            Hip abduction 2x10            Calf raises Discussed progression of seated and standing double leg calf raises from [id number] to 3x8 reps Seated 15# 3x12; standing eccentric 4x8 with slow lower repeat Standing eccentric 4x8 from wedge, seated 50# on LAQ 4x8 Seated 75# 4x8 single leg with forefoot on half roll Seated 75# 3x115 single leg with forefoot on half roll Seated 87# 3x10; toe raises 3x15 Seated 75# x10; 87# 3x10; SL from incline attempted, SL from floor 2x10, ecc from incline 2x10 repeat Seated 75# 4x10 3-0-3 tempo   Inversion/Eversion  Black tband 2x15 each repeat repeat repeat Black tband 3x15 in and ev Black tband 3x15 in and ev repeat repeat Black tband 3x15 each   Lateral band walks    Black clx x 3 laps         Great toe flexion     3x20 green tband  3x20 green tband      Squat        Single leg from 6\" step 3x10 repeat    NuStep  5 min L4 repeat 5 min L4 6 min L$ 5 min L4 5 min L4 5 min L4 repeat 5 min L4   Proprioceptive Activities:                                                    Manual Therapy:  15 min 15 min 15 min 15 min 15 min 15 min 15 min 15 min 15 min   Soft tissue mobilization Next visit To posterior calf, posterior tibialis repeat repeat Repeat; focusing on gastroc-soleus junction at mid calf repeat repeat repeat repeat STM to posterior calf, posterior tibialis; lateral gastroc                Therapeutic Activities: HEP: see 10/31/18 flow sheet for specifics. Educated on managing load of calf raises based on symptom response. Write.my Portal  Treatment/Session Assessment: Limited tolerance to loading remains through the achilles tendon as she is continuing to have intermittent swelling based on activity. · Pain/ Symptoms: Initial:   3/10 \"It's still been improving. I saw the doc and he gave me a script for more therapy. It's still sore, but i've been able to go without the boot. \" Post Session:  No increase following today's treatment session. ·   Compliance with Program/Exercises: Will assess as treatment progresses. · Recommendations/Intent for next treatment session: \"Next visit will focus on advancements to more challenging activities\".   Total Treatment Duration:  PT Patient Time In/Time Out  Time In: 0845  Time Out: 915 [facility], T

## 2018-12-27 ENCOUNTER — HOSPITAL ENCOUNTER (OUTPATIENT)
Dept: PHYSICAL THERAPY | Age: 48
Discharge: HOME | End: 2018-12-27
Payer: COMMERCIAL

## 2019-01-03 ENCOUNTER — HOSPITAL ENCOUNTER (OUTPATIENT)
Dept: PHYSICAL THERAPY | Age: 49
Discharge: HOME | End: 2019-01-03
Payer: COMMERCIAL

## 2019-01-03 PROCEDURE — 97140 MANUAL THERAPY 1/> REGIONS: CPT

## 2019-01-03 PROCEDURE — 97110 THERAPEUTIC EXERCISES: CPT

## 2019-01-03 NOTE — THERAPY EVALUATION
Joceline Case : 1970 [place] @ [address] [phone]   [phone] OUTPATIENT PHYSICAL THERAPY:Daily Note 1/3/2019 ICD-10: Treatment Diagnosis: Pain in left ankle and joints of left foot (M25.572); Difficulty in walking, not elsewhere classified (R26.2) Precautions/Allergies:  
Patient has no known allergies. Fall Risk Score: 0 (? 5 = High Risk) MD Orders: evaluate and treat MEDICAL/REFERRING DIAGNOSIS: 
Pain in left ankle and joints of left foot [M25.572] DATE OF ONSET: chronic history with worsening over the past 6 weeks REFERRING PHYSICIAN: Gris Carrillo MD 
RETURN PHYSICIAN APPOINTMENT: 18 INITIAL ASSESSMENT:  Ms. Soledad Barraza presents to therapy with left ankle pain secondary to midsubstance achilles tendinosis with braden's deformity. She has restricted calf flexibility. She has weakness through the calf musculature, intrinsic foot musculature, and poor motor control of the hip musculature. Symptoms are moderately reactive. Skilled therapy is required to return to prior level of function. PROBLEM LIST (Impacting functional limitations): 1. Decreased Strength 2. Decreased ADL/Functional Activities 3. Decreased Ambulation Ability/Technique 4. Decreased Balance 5. Increased Pain 6. Decreased Activity Tolerance 7. Decreased Flexibility/Joint Mobility INTERVENTIONS PLANNED: 
1. Family Education 2. Gait Training 3. Home Exercise Program (HEP) 4. Manual Therapy 5. Neuromuscular Re-education/Strengthening 6. Range of Motion (ROM) 7. Therapeutic Activites 8. Therapeutic Exercise/Strengthening 9. modalities TREATMENT PLAN: 
Effective Dates: 10/31/2018 TO 2019 (90 days). Frequency/Duration: 2 times a week for 90 Days GOALS: (Goals have been discussed and agreed upon with patient.) Short-Term Functional Goals: Time Frame: 2 weeks 1. Patient will be independent with HEP. MET 2. Patient will report pain <2/10 with daily activity. PROGRESSING Discharge Goals: Time Frame: 6 weeks 1. Patient will report no pain with daily activity to perform work duties without use of boot. NOT MET 2. Patient will be able to perform 10 single leg heel raises through full ROM to restore functional calf strength. NOT MET 3. Patient will improve ankle DF to 15 ° to decrease strain on the achilles with daily activity such as stair descent. NOT MET Rehabilitation Potential For Stated Goals: Excellent Regarding Consuelo Verdugo therapy, I certify that the treatment plan above will be carried out by a therapist or under their direction. Thank you for this referral, Angelique Stover DPT HISTORY:  
History of Present Injury/Illness (Reason for Referral): 
Patient presents to therapy with primary complaint of left posterior heel pain. She notes a chronic history of heel pain that has been worsening over the past 6 weeks. She has recently had excruciating pain in the morning, pain with end range movements into passive plantar flexion, and pain with general walking activities. 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 Since her initial MD consult, she has begun to use a walking boot during the day and a compressive sock in the evening. This has significantly been helping. She has also begun to take meloxicam which has also been helping. Past Medical History/Comorbidities:  
Ms. Candace Basurto  has a past medical history of Morbid obesity ([address] Nausea & vomiting, Psychiatric disorder, and Unspecified adverse effect of anesthesia. Ms. Candace Basurto  has a past surgical history that includes hx  section; hx other surgical (, ); and LIPOMA EXCISION MID BACK (N/A, 2/3/2014). Social History/Living Environment:  
  Patient lives at home with her family. Prior Level of Function/Work/Activity: Patient is a . Dominant Side:  
      RIGHT Current Medications:   
Current Outpatient Medications:  
  ondansetron (ZOFRAN ODT) 4 mg disintegrating tablet, Take 4 mg by mouth every eight (8) hours as needed for Nausea., Disp: , Rfl:  
  Desvenlafaxine (PRISTIQ) 100 mg Tb24, Take 1 Tab by mouth nightly., Disp: , Rfl:  
  multivitamin (ONE A DAY) tablet, Take 1 Tab by mouth daily. , Disp: , Rfl:  
  cholecalciferol, vitamin D3, (VITAMIN D3) 2,000 unit tab, Take 1 Tab by mouth daily. , Disp: , Rfl:   
Date Last Reviewed:  1/3/2019 EXAMINATION:  
Observation/Orthostatic Postural Assessment:   
      Magui's deformity noted at the posterior heel. Mild rearfoot eversion present from initial contact to midstance. Minimal navicular drop present through stance. She has early heel rise in stance phase of gait. Palpation:   
      Tender through midsubstance of the achilles and over the posterior heel. ROM:   
 Ankle DF: 10 ° L; 15 ° R Ankle PF: 40 ° B Ankle inversion: WNL Ankle eversion: WNL Great toe extension is mildly restricted Hip rotation is WNL 
 11/26/18 update: Ankle DF: 12 ° L Great toe extension is full Strength: Ankle DF: 5/5 Ankle PF: 4/5 L; 5/5 R Ankle inversion :4+/5 L; 5/5 R Ankle eversion: 5/5 B Hip extension: 4/5 B Hip abduction: 4/5 B Ankle PF: Able to perform seated heel rasie at 75#; performing eccentric heel raises standing Special Tests:   
      Slump and SLR testing are negative for provocation of symptoms. Mild discomfort with overpressure into ankle PF Neurological Screen: 
      Sensation is fully intact Functional Mobility:  
      Independent Balance:   
      Increased hip righting reactions with left single leg stance Body Structures Involved: 1. Nerves 2. Bones 3. Joints 4. Muscles 5. Ligaments Body Functions Affected: 1. Sensory/Pain 2. Neuromusculoskeletal 
3. Movement Related Activities and Participation Affected: 1. Learning and Applying Knowledge 2. General Tasks and Demands 3. Mobility 4. Self Care 5. Domestic Life 6. Interpersonal Interactions and Relationships 7. Community, Social and [place] CLINICAL PRESENTATION:  
CLINICAL DECISION MAKING:  
Outcome Measure: Tool Used: Lower Extremity Functional Scale (LEFS) Score:  Initial: 37/80 Most Recent: X/80 (Date: -- ) Interpretation of Score: 20 questions each scored on a 5 point scale with 0 representing \"extreme difficulty or unable to perform\" and 4 representing \"no difficulty\". The lower the score, the greater the functional disability. 80/80 represents no disability. Minimal detectable change is 9 points. Score 80 79-63 62-48 47-32 31-16 15-1 0 Modifier CH CI CJ CK CL CM CN Medical Necessity:  
· Patient is expected to demonstrate progress in strength, range of motion, balance and coordination to increase independence with community mobility and return to prior level of function. Reason for Services/Other Comments: 
· Patient has demonstrated an improvement in functional level by independent performance of HEP. TREATMENT:  
(In addition to Assessment/Re-Assessment sessions the following treatments were rendered) THERAPEUTIC EXERCISE: (see below for minutes):  Exercises per grid below to improve mobility, strength, balance and coordination. Required minimal verbal and manual cues to promote proper body alignment, promote proper body posture and promote proper body mechanics. Progressed resistance, range, repetitions and complexity of movement as indicated. MANUAL THERAPY: (see below for minutes): Joint mobilization and Soft tissue mobilization was utilized and necessary because of the patient's restricted joint motion, painful spasm, loss of articular motion and restricted motion of soft tissue. MODALITIES: (see below for minutes): To decrease pain Date: 10/31/18  11/05/18 (visit 2) 11/07/18 (visit 3) 11/13/18 (visit 4) 11/14/18 (visit 5) 11/19/18 (visit 6) 11/21/18 (visit 7) 11/26/18 (visit 8) progress note 11/28/18 (visit 9) 12/18/18 (visit 10) 1/3/19 (visit 11) Modalities:        10 min In cold whirlpool for swelling resolution Therapeutic Exercise: 20 min 30 min 30 min 30 min 30 min 15 min 30 min 30 min 30 min 30 min 30 min Stretching [id number] self ankle mobilization with knee drive to wall repeat repeat repeat repeat  [id number]; with knee rock 15x repeat [id number] self MWM, standing gastroc stretch 2x1 min L1 [id number] self MWM [id number] self MWM, sustained seated stretch at LAQ maching 100# 2 min sustained Bridge [id number] double leg Hip abduction 2x10 Calf raises Discussed progression of seated and standing double leg calf raises from [id number] to 3x8 reps Seated 15# 3x12; standing eccentric 4x8 with slow lower repeat Standing eccentric 4x8 from wedge, seated 50# on LAQ 4x8 Seated 75# 4x8 single leg with forefoot on half roll Seated 75# 3x115 single leg with forefoot on half roll Seated 87# 3x10; toe raises 3x15 Seated 75# x10; 87# 3x10; SL from incline attempted, SL from floor 2x10, ecc from incline 2x10 repeat Seated 75# 4x10 3-0-3 tempo Seated 75# x10, 100# 3x10 3-0-3 tempo Inversion/Eversion  Black tband 2x15 each repeat repeat repeat Black tband 3x15 in and ev Black tband 3x15 in and ev repeat repeat Black tband 3x15 each repeat Lateral band walks    Black clx x 3 laps Great toe flexion     3x20 green tband  3x20 green tband Squat        Single leg from 6\" step 3x10 repeat NuStep  5 min L4 repeat 5 min L4 6 min L$ 5 min L4 5 min L4 5 min L4 repeat 5 min L4 5 min L4 Proprioceptive Activities:             
             
             
             
Manual Therapy:  15 min 15 min 15 min 15 min 15 min 15 min 15 min 15 min 15 min 15 min Soft tissue mobilization Next visit To posterior calf, posterior tibialis repeat repeat Repeat; focusing on gastroc-soleus junction at mid calf repeat repeat repeat repeat STM to posterior calf, posterior tibialis; lateral gastroc repeat Therapeutic Activities: HEP: see 10/31/18 flow sheet for specifics. Educated on managing load of calf raises based on symptom response. [facility] Portal 
Treatment/Session Assessment: Demonstrates symmetric step pattern. Continues to have slight edema at the posterior heel due to tendon irritation. · Pain/ Symptoms: Initial:   3/10 \"I haven't been as diligent with my exercises, but it's still doing better. I did start taking my anti-inflammatory again the other day and that seems to really be helping but I don't want to be taking it. \" Post Session:  No increase following today's treatment session. ·  
Compliance with Program/Exercises: Will assess as treatment progresses. · Recommendations/Intent for next treatment session: \"Next visit will focus on advancements to more challenging activities\". Total Treatment Duration: PT Patient Time In/Time Out Time In: 0845 Time Out: 0930 Gregoria Echavarria, DPT

## 2019-01-08 ENCOUNTER — HOSPITAL ENCOUNTER (OUTPATIENT)
Dept: PHYSICAL THERAPY | Age: 49
Discharge: HOME | End: 2019-01-08
Payer: COMMERCIAL

## 2019-01-08 PROCEDURE — 97110 THERAPEUTIC EXERCISES: CPT

## 2019-01-08 PROCEDURE — 97140 MANUAL THERAPY 1/> REGIONS: CPT

## 2019-01-08 NOTE — THERAPY EVALUATION
Salome Johnson : 1970 [id number] @ [address], [address] [place] [address] [phone]   [phone] OUTPATIENT PHYSICAL THERAPY:Daily Note 2019 ICD-10: Treatment Diagnosis: Pain in left ankle and joints of left foot (M25.572); Difficulty in walking, not elsewhere classified (R26.2) Precautions/Allergies:  
Patient has no known allergies. Fall Risk Score: 0 (? 5 = High Risk) MD Orders: evaluate and treat MEDICAL/REFERRING DIAGNOSIS: 
Pain in left ankle and joints of left foot [M25.572] DATE OF ONSET: chronic history with worsening over the past 6 weeks REFERRING PHYSICIAN: Noelle Valentin MD 
RETURN PHYSICIAN APPOINTMENT: 18 INITIAL ASSESSMENT:  Ms. Darryn Pacheco presents to therapy with left ankle pain secondary to midsubstance achilles tendinosis with braden's deformity. She has restricted calf flexibility. She has weakness through the calf musculature, intrinsic foot musculature, and poor motor control of the hip musculature. Symptoms are moderately reactive. Skilled therapy is required to return to prior level of function. PROBLEM LIST (Impacting functional limitations): 1. Decreased Strength 2. Decreased ADL/Functional Activities 3. Decreased Ambulation Ability/Technique 4. Decreased Balance 5. Increased Pain 6. Decreased Activity Tolerance 7. Decreased Flexibility/Joint Mobility INTERVENTIONS PLANNED: 
1. Family Education 2. Gait Training 3. Home Exercise Program (HEP) 4. Manual Therapy 5. Neuromuscular Re-education/Strengthening 6. Range of Motion (ROM) 7. Therapeutic Activites 8. Therapeutic Exercise/Strengthening 9. modalities TREATMENT PLAN: 
Effective Dates: 10/31/2018 TO 2019 (90 days). Frequency/Duration: 2 times a week for 90 Days GOALS: (Goals have been discussed and agreed upon with patient.) Short-Term Functional Goals: Time Frame: 2 weeks 1. Patient will be independent with HEP. MET 2. Patient will report pain <2/10 with daily activity. PROGRESSING Discharge Goals: Time Frame: 6 weeks 1. Patient will report no pain with daily activity to perform work duties without use of boot. NOT MET 2. Patient will be able to perform 10 single leg heel raises through full ROM to restore functional calf strength. NOT MET 3. Patient will improve ankle DF to 15 ° to decrease strain on the achilles with daily activity such as stair descent. NOT MET Rehabilitation Potential For Stated Goals: Excellent Regarding Consuelo Verdugo therapy, I certify that the treatment plan above will be carried out by a therapist or under their direction. Thank you for this referral, Blas Blood DPT HISTORY:  
History of Present Injury/Illness (Reason for Referral): 
Patient presents to therapy with primary complaint of left posterior heel pain. She notes a chronic history of heel pain that has been worsening over the past 6 weeks. She has recently had excruciating pain in the morning, pain with end range movements into passive plantar flexion, and pain with general walking activities. 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 Since her initial MD consult, she has begun to use a walking boot during the day and a compressive sock in the evening. This has significantly been helping. She has also begun to take meloxicam which has also been helping. Past Medical History/Comorbidities:  
Ms. Maddy Velazquez  has a past medical history of Morbid obesity ([address] Nausea & vomiting, Psychiatric disorder, and Unspecified adverse effect of anesthesia. Ms. Maddy Velazquez  has a past surgical history that includes hx  section; hx other surgical (, ); and LIPOMA EXCISION MID BACK (N/A, 2/3/2014). Social History/Living Environment:  
  Patient lives at home with her family. Prior Level of Function/Work/Activity: Patient is a . Dominant Side:  
      RIGHT Current Medications:   
Current Outpatient Medications:  
  ondansetron (ZOFRAN ODT) 4 mg disintegrating tablet, Take 4 mg by mouth every eight (8) hours as needed for Nausea., Disp: , Rfl:  
  Desvenlafaxine (PRISTIQ) 100 mg Tb24, Take 1 Tab by mouth nightly., Disp: , Rfl:  
  multivitamin (ONE A DAY) tablet, Take 1 Tab by mouth daily. , Disp: , Rfl:  
  cholecalciferol, vitamin D3, (VITAMIN D3) 2,000 unit tab, Take 1 Tab by mouth daily. , Disp: , Rfl:   
Date Last Reviewed:  1/8/2019 EXAMINATION:  
Observation/Orthostatic Postural Assessment:   
      Magui's deformity noted at the posterior heel. Mild rearfoot eversion present from initial contact to midstance. Minimal navicular drop present through stance. She has early heel rise in stance phase of gait. Palpation:   
      Tender through midsubstance of the achilles and over the posterior heel. ROM:   
 Ankle DF: 10 ° L; 15 ° R Ankle PF: 40 ° B Ankle inversion: WNL Ankle eversion: WNL Great toe extension is mildly restricted Hip rotation is WNL 
 11/26/18 update: Ankle DF: 12 ° L Great toe extension is full Strength: Ankle DF: 5/5 Ankle PF: 4/5 L; 5/5 R Ankle inversion :4+/5 L; 5/5 R Ankle eversion: 5/5 B Hip extension: 4/5 B Hip abduction: 4/5 B Ankle PF: Able to perform seated heel rasie at 75#; performing eccentric heel raises standing Special Tests:   
      Slump and SLR testing are negative for provocation of symptoms. Mild discomfort with overpressure into ankle PF Neurological Screen: 
      Sensation is fully intact Functional Mobility:  
      Independent Balance:   
      Increased hip righting reactions with left single leg stance Body Structures Involved: 1. Nerves 2. Bones 3. Joints 4. Muscles 5. Ligaments Body Functions Affected: 1. Sensory/Pain 2. Neuromusculoskeletal 
3. Movement Related Activities and Participation Affected: 1. Learning and Applying Knowledge 2. General Tasks and Demands 3. Mobility 4. Self Care 5. Domestic Life 6. Interpersonal Interactions and Relationships 7. Community, Social and [place] CLINICAL PRESENTATION:  
CLINICAL DECISION MAKING:  
Outcome Measure: Tool Used: Lower Extremity Functional Scale (LEFS) Score:  Initial: 37/80 Most Recent: X/80 (Date: -- ) Interpretation of Score: 20 questions each scored on a 5 point scale with 0 representing \"extreme difficulty or unable to perform\" and 4 representing \"no difficulty\". The lower the score, the greater the functional disability. 80/80 represents no disability. Minimal detectable change is 9 points. Score 80 79-63 62-48 47-32 31-16 15-1 0 Modifier CH CI CJ CK CL CM CN Medical Necessity:  
· Patient is expected to demonstrate progress in strength, range of motion, balance and coordination to increase independence with community mobility and return to prior level of function. Reason for Services/Other Comments: 
· Patient has demonstrated an improvement in functional level by independent performance of HEP. TREATMENT:  
(In addition to Assessment/Re-Assessment sessions the following treatments were rendered) THERAPEUTIC EXERCISE: (see below for minutes):  Exercises per grid below to improve mobility, strength, balance and coordination. Required minimal verbal and manual cues to promote proper body alignment, promote proper body posture and promote proper body mechanics. Progressed resistance, range, repetitions and complexity of movement as indicated. MANUAL THERAPY: (see below for minutes): Joint mobilization and Soft tissue mobilization was utilized and necessary because of the patient's restricted joint motion, painful spasm, loss of articular motion and restricted motion of soft tissue. MODALITIES: (see below for minutes): To decrease pain Date: 10/31/18  11/05/18 (visit 2) 11/07/18 (visit 3) 11/13/18 (visit 4) 11/14/18 (visit 5) 11/19/18 (visit 6) 11/21/18 (visit 7) 11/26/18 (visit 8) progress note 11/28/18 (visit 9) 12/18/18 (visit 10) 1/3/19 (visit 11) 1/9/19 (visit 12) Modalities:        10 min In cold whirlpool for swelling resolution Therapeutic Exercise: 20 min 30 min 30 min 30 min 30 min 15 min 30 min 30 min 30 min 30 min 30 min 30 min Stretching [id number] self ankle mobilization with knee drive to wall repeat repeat repeat repeat  [id number]; with knee rock 15x repeat [id number] self MWM, standing gastroc stretch 2x1 min L1 [id number] self MWM [id number] self MWM, sustained seated stretch at LAQ maching 100# 2 min sustained repeat Bridge [id number] double leg Hip abduction 2x10 Calf raises Discussed progression of seated and standing double leg calf raises from [id number] to 3x8 reps Seated 15# 3x12; standing eccentric 4x8 with slow lower repeat Standing eccentric 4x8 from wedge, seated 50# on LAQ 4x8 Seated 75# 4x8 single leg with forefoot on half roll Seated 75# 3x115 single leg with forefoot on half roll Seated 87# 3x10; toe raises 3x15 Seated 75# x10; 87# 3x10; SL from incline attempted, SL from floor 2x10, ecc from incline 2x10 repeat Seated 75# 4x10 3-0-3 tempo Seated 75# x10, 100# 3x10 3-0-3 tempo Seated 75# x10; 100# 4x10 Inversion/Eversion  Black tband 2x15 each repeat repeat repeat Black tband 3x15 in and ev Black tband 3x15 in and ev repeat repeat Black tband 3x15 each repeat Black tband 3x15 each Lateral band walks    Black clx x 3 laps Great toe flexion     3x20 green tband  3x20 green tband Squat        Single leg from 6\" step 3x10 repeat NuStep  5 min L4 repeat 5 min L4 6 min L$ 5 min L4 5 min L4 5 min L4 repeat 5 min L4 5 min L4 5 min L4 Proprioceptive Activities:              
              
              
              
Manual Therapy:  15 min 15 min 15 min 15 min 15 min 15 min 15 min 15 min 15 min 15 min 15 min Soft tissue mobilization Next visit To posterior calf, posterior tibialis repeat repeat Repeat; focusing on gastroc-soleus junction at mid calf repeat repeat repeat repeat STM to posterior calf, posterior tibialis; lateral gastroc repeat repeat Therapeutic Activities: HEP: see 10/31/18 flow sheet for specifics. Educated on managing load of calf raises based on symptom response. [facility] Portal 
Treatment/Session Assessment: Swelling has decreased to the posterior heel. Continued braden's deformity present but she is progressing with loading of the calf and achilles with improvement in symptoms. · Pain/ Symptoms: Initial:   3/10 \"I've been trying to be a bit more active and it's been feeling better. I'm back to work and it seems like it's doing fine with being on my feet all day. It still feels stiff by the end of my day. \" Post Session:  No increase following today's treatment session. ·  
Compliance with Program/Exercises: Will assess as treatment progresses. · Recommendations/Intent for next treatment session: \"Next visit will focus on advancements to more challenging activities\". Total Treatment Duration: PT Patient Time In/Time Out Time In: 3697 Time Out: 6973 Nestor Gautam DPT

## 2019-01-10 ENCOUNTER — HOSPITAL ENCOUNTER (OUTPATIENT)
Dept: PHYSICAL THERAPY | Age: 49
Discharge: HOME | End: 2019-01-10
Payer: COMMERCIAL

## 2019-01-10 PROCEDURE — 97140 MANUAL THERAPY 1/> REGIONS: CPT

## 2019-01-10 PROCEDURE — 97110 THERAPEUTIC EXERCISES: CPT

## 2019-01-10 NOTE — THERAPY EVALUATION
Madison Mcallister : 1970 [address] @ [address] [place] [address]. [phone]   [phone] OUTPATIENT PHYSICAL THERAPY:Daily Note 1/10/2019 ICD-10: Treatment Diagnosis: Pain in left ankle and joints of left foot (M25.572); Difficulty in walking, not elsewhere classified (R26.2) Precautions/Allergies:  
Patient has no known allergies. Fall Risk Score: 0 (? 5 = High Risk) MD Orders: evaluate and treat MEDICAL/REFERRING DIAGNOSIS: 
Pain in left ankle and joints of left foot [M25.572] DATE OF ONSET: chronic history with worsening over the past 6 weeks REFERRING PHYSICIAN: Pinky Aden MD 
RETURN PHYSICIAN APPOINTMENT: 18 INITIAL ASSESSMENT:  Ms. Inderjit Vasquez presents to therapy with left ankle pain secondary to midsubstance achilles tendinosis with braden's deformity. She has restricted calf flexibility. She has weakness through the calf musculature, intrinsic foot musculature, and poor motor control of the hip musculature. Symptoms are moderately reactive. Skilled therapy is required to return to prior level of function. PROBLEM LIST (Impacting functional limitations): 1. Decreased Strength 2. Decreased ADL/Functional Activities 3. Decreased Ambulation Ability/Technique 4. Decreased Balance 5. Increased Pain 6. Decreased Activity Tolerance 7. Decreased Flexibility/Joint Mobility INTERVENTIONS PLANNED: 
1. Family Education 2. Gait Training 3. Home Exercise Program (HEP) 4. Manual Therapy 5. Neuromuscular Re-education/Strengthening 6. Range of Motion (ROM) 7. Therapeutic Activites 8. Therapeutic Exercise/Strengthening 9. modalities TREATMENT PLAN: 
Effective Dates: 10/31/2018 TO 2019 (90 days). Frequency/Duration: 2 times a week for 90 Days GOALS: (Goals have been discussed and agreed upon with patient.) Short-Term Functional Goals: Time Frame: 2 weeks 1. Patient will be independent with HEP. MET 2. Patient will report pain <2/10 with daily activity. PROGRESSING Discharge Goals: Time Frame: 6 weeks 1. Patient will report no pain with daily activity to perform work duties without use of boot. NOT MET 2. Patient will be able to perform 10 single leg heel raises through full ROM to restore functional calf strength. NOT MET 3. Patient will improve ankle DF to 15 ° to decrease strain on the achilles with daily activity such as stair descent. NOT MET Rehabilitation Potential For Stated Goals: Excellent Regarding Consuelo Verdugo therapy, I certify that the treatment plan above will be carried out by a therapist or under their direction. Thank you for this referral, Janny Tobin DPT HISTORY:  
History of Present Injury/Illness (Reason for Referral): 
Patient presents to therapy with primary complaint of left posterior heel pain. She notes a chronic history of heel pain that has been worsening over the past 6 weeks. She has recently had excruciating pain in the morning, pain with end range movements into passive plantar flexion, and pain with general walking activities. 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 Since her initial MD consult, she has begun to use a walking boot during the day and a compressive sock in the evening. This has significantly been helping. She has also begun to take meloxicam which has also been helping. Past Medical History/Comorbidities:  
Ms. Santhosh Reis  has a past medical history of Morbid obesity ([facility] [address] Nausea & vomiting, Psychiatric disorder, and Unspecified adverse effect of anesthesia. Ms. Santhosh Reis  has a past surgical history that includes hx  section; hx other surgical (, ); and LIPOMA EXCISION MID BACK (N/A, 2/3/2014). Social History/Living Environment:  
  Patient lives at home with her family. Prior Level of Function/Work/Activity: Patient is a . Dominant Side:  
      RIGHT Current Medications:   
Current Outpatient Medications:  
  ondansetron (ZOFRAN ODT) 4 mg disintegrating tablet, Take 4 mg by mouth every eight (8) hours as needed for Nausea., Disp: , Rfl:  
  Desvenlafaxine (PRISTIQ) 100 mg Tb24, Take 1 Tab by mouth nightly., Disp: , Rfl:  
  multivitamin (ONE A DAY) tablet, Take 1 Tab by mouth daily. , Disp: , Rfl:  
  cholecalciferol, vitamin D3, (VITAMIN D3) 2,000 unit tab, Take 1 Tab by mouth daily. , Disp: , Rfl:   
Date Last Reviewed:  1/10/2019 EXAMINATION:  
Observation/Orthostatic Postural Assessment:   
      Magui's deformity noted at the posterior heel. Mild rearfoot eversion present from initial contact to midstance. Minimal navicular drop present through stance. She has early heel rise in stance phase of gait. Palpation:   
      Tender through midsubstance of the achilles and over the posterior heel. ROM:   
 Ankle DF: 10 ° L; 15 ° R Ankle PF: 40 ° B Ankle inversion: WNL Ankle eversion: WNL Great toe extension is mildly restricted Hip rotation is WNL 
 11/26/18 update: Ankle DF: 12 ° L Great toe extension is full Strength: Ankle DF: 5/5 Ankle PF: 4/5 L; 5/5 R Ankle inversion :4+/5 L; 5/5 R Ankle eversion: 5/5 B Hip extension: 4/5 B Hip abduction: 4/5 B Ankle PF: Able to perform seated heel rasie at 75#; performing eccentric heel raises standing Special Tests:   
      Slump and SLR testing are negative for provocation of symptoms. Mild discomfort with overpressure into ankle PF Neurological Screen: 
      Sensation is fully intact Functional Mobility:  
      Independent Balance:   
      Increased hip righting reactions with left single leg stance Body Structures Involved: 1. Nerves 2. Bones 3. Joints 4. Muscles 5. Ligaments Body Functions Affected: 1. Sensory/Pain 2. Neuromusculoskeletal 
3. Movement Related Activities and Participation Affected: 1. Learning and Applying Knowledge 2. General Tasks and Demands 3. Mobility 4. Self Care 5. Domestic Life 6. Interpersonal Interactions and Relationships 7. Community, Social and [place] CLINICAL PRESENTATION:  
CLINICAL DECISION MAKING:  
Outcome Measure: Tool Used: Lower Extremity Functional Scale (LEFS) Score:  Initial: 37/80 Most Recent: X/80 (Date: -- ) Interpretation of Score: 20 questions each scored on a 5 point scale with 0 representing \"extreme difficulty or unable to perform\" and 4 representing \"no difficulty\". The lower the score, the greater the functional disability. 80/80 represents no disability. Minimal detectable change is 9 points. Score 80 79-63 62-48 47-32 31-16 15-1 0 Modifier CH CI CJ CK CL CM CN Medical Necessity:  
· Patient is expected to demonstrate progress in strength, range of motion, balance and coordination to increase independence with community mobility and return to prior level of function. Reason for Services/Other Comments: 
· Patient has demonstrated an improvement in functional level by independent performance of HEP. TREATMENT:  
(In addition to Assessment/Re-Assessment sessions the following treatments were rendered) THERAPEUTIC EXERCISE: (see below for minutes):  Exercises per grid below to improve mobility, strength, balance and coordination. Required minimal verbal and manual cues to promote proper body alignment, promote proper body posture and promote proper body mechanics. Progressed resistance, range, repetitions and complexity of movement as indicated. MANUAL THERAPY: (see below for minutes): Joint mobilization and Soft tissue mobilization was utilized and necessary because of the patient's restricted joint motion, painful spasm, loss of articular motion and restricted motion of soft tissue. MODALITIES: (see below for minutes): To decrease pain Date: 11/21/18 (visit 7) 11/26/18 (visit 8) progress note 11/28/18 (visit 9) 12/18/18 (visit 10) 1/3/19 (visit 11) 1/9/19 (visit 12) 1/11/19 (visit 13) Modalities:  10 min In cold whirlpool for swelling resolution Therapeutic Exercise: 30 min 30 min 30 min 30 min 30 min 30 min 30 min Stretching [id number]; with knee rock 15x repeat [id number] self MWM, standing gastroc stretch 2x1 min L1 [id number] self MWM [id number] self MWM, sustained seated stretch at LAQ maching 100# 2 min sustained repeat repeat Bridge Hip abduction Calf raises Seated 87# 3x10; toe raises 3x15 Seated 75# x10; 87# 3x10; SL from incline attempted, SL from floor 2x10, ecc from incline 2x10 repeat Seated 75# 4x10 3-0-3 tempo Seated 75# x10, 100# 3x10 3-0-3 tempo Seated 75# x10; 100# 4x10 repeat Inversion/Eversion Black tband 3x15 in and ev repeat repeat Black tband 3x15 each repeat Black tband 3x15 each Black tband 3x20 each Lateral band walks Great toe flexion 3x20 green tband Squat  Single leg from 6\" step 3x10 repeat NuStep 5 min L4 5 min L4 repeat 5 min L4 5 min L4 5 min L4 5 min L4 Proprioceptive Activities:         
         
         
         
Manual Therapy: 15 min 15 min 15 min 15 min 15 min 15 min 15 min Soft tissue mobilization repeat repeat repeat STM to posterior calf, posterior tibialis; lateral gastroc repeat repeat STM to posterior calf, posterior tib, lateral gastroc, transverse friction to distal achilles Therapeutic Activities: HEP: see 10/31/18 flow sheet for specifics. Educated on managing load of calf raises based on symptom response. [facility] 
Treatment/Session Assessment: Discussed incorporating further physical activity through her day to improve loading tolerance of the calf. Demonstrates sufficient healing to progress with further loading with seated and standing calf raises. · Pain/ Symptoms: Initial:   3/10 \"I think it's doing better. I still am taking the antiinflammatories, but i'm doing well with being on my feet at school again. \" Post Session:  No increase following today's treatment session. ·  
Compliance with Program/Exercises: Will assess as treatment progresses. · Recommendations/Intent for next treatment session: \"Next visit will focus on advancements to more challenging activities\". Total Treatment Duration: PT Patient Time In/Time Out Time In: 6782 Time Out: 0530 Sydni Logan DPT

## 2019-01-15 ENCOUNTER — HOSPITAL ENCOUNTER (OUTPATIENT)
Dept: PHYSICAL THERAPY | Age: 49
Discharge: HOME | End: 2019-01-15
Payer: COMMERCIAL

## 2019-01-15 PROCEDURE — 97140 MANUAL THERAPY 1/> REGIONS: CPT

## 2019-01-15 PROCEDURE — 97110 THERAPEUTIC EXERCISES: CPT

## 2019-01-15 NOTE — THERAPY EVALUATION
Consuelo Duncan : 1970 [address] @ [address] [address]. [phone]   [phone] OUTPATIENT PHYSICAL THERAPY:Daily Note 1/15/2019 ICD-10: Treatment Diagnosis: Pain in left ankle and joints of left foot (M25.572); Difficulty in walking, not elsewhere classified (R26.2) Precautions/Allergies:  
Patient has no known allergies. Fall Risk Score: 0 (? 5 = High Risk) MD Orders: evaluate and treat MEDICAL/REFERRING DIAGNOSIS: 
Pain in left ankle and joints of left foot [M25.572] DATE OF ONSET: chronic history with worsening over the past 6 weeks REFERRING PHYSICIAN: Sylvie June MD 
RETURN PHYSICIAN APPOINTMENT: 18 INITIAL ASSESSMENT:  Ms. Perri Alfaro presents to therapy with left ankle pain secondary to midsubstance achilles tendinosis with braden's deformity. She has restricted calf flexibility. She has weakness through the calf musculature, intrinsic foot musculature, and poor motor control of the hip musculature. Symptoms are moderately reactive. Skilled therapy is required to return to prior level of function. PROBLEM LIST (Impacting functional limitations): 1. Decreased Strength 2. Decreased ADL/Functional Activities 3. Decreased Ambulation Ability/Technique 4. Decreased Balance 5. Increased Pain 6. Decreased Activity Tolerance 7. Decreased Flexibility/Joint Mobility INTERVENTIONS PLANNED: 
1. Family Education 2. Gait Training 3. Home Exercise Program (HEP) 4. Manual Therapy 5. Neuromuscular Re-education/Strengthening 6. Range of Motion (ROM) 7. Therapeutic Activites 8. Therapeutic Exercise/Strengthening 9. modalities TREATMENT PLAN: 
Effective Dates: 10/31/2018 TO 2019 (90 days). Frequency/Duration: 2 times a week for 90 Days GOALS: (Goals have been discussed and agreed upon with patient.) Short-Term Functional Goals: Time Frame: 2 weeks 1. Patient will be independent with HEP. MET 2. Patient will report pain <2/10 with daily activity. PROGRESSING Discharge Goals: Time Frame: 6 weeks 1. Patient will report no pain with daily activity to perform work duties without use of boot. NOT MET 2. Patient will be able to perform 10 single leg heel raises through full ROM to restore functional calf strength. NOT MET 3. Patient will improve ankle DF to 15 ° to decrease strain on the achilles with daily activity such as stair descent. NOT MET Rehabilitation Potential For Stated Goals: Excellent Regarding Consuelo Verdugo therapy, I certify that the treatment plan above will be carried out by a therapist or under their direction. Thank you for this referral, Anali Alvarez DPT HISTORY:  
History of Present Injury/Illness (Reason for Referral): 
Patient presents to therapy with primary complaint of left posterior heel pain. She notes a chronic history of heel pain that has been worsening over the past 6 weeks. She has recently had excruciating pain in the morning, pain with end range movements into passive plantar flexion, and pain with general walking activities. 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 Since her initial MD consult, she has begun to use a walking boot during the day and a compressive sock in the evening. This has significantly been helping. She has also begun to take meloxicam which has also been helping. Past Medical History/Comorbidities:  
Ms. Earl Feliciano  has a past medical history of Morbid obesity ([address] Nausea & vomiting, Psychiatric disorder, and Unspecified adverse effect of anesthesia. Ms. Earl Feliciano  has a past surgical history that includes hx  section; hx other surgical (, ); and LIPOMA EXCISION MID BACK (N/A, 2/3/2014). Social History/Living Environment:  
  Patient lives at home with her family. Prior Level of Function/Work/Activity: Patient is a . Dominant Side:  
      RIGHT Current Medications:   
Current Outpatient Medications:  
  ondansetron (ZOFRAN ODT) 4 mg disintegrating tablet, Take 4 mg by mouth every eight (8) hours as needed for Nausea., Disp: , Rfl:  
  Desvenlafaxine (PRISTIQ) 100 mg Tb24, Take 1 Tab by mouth nightly., Disp: , Rfl:  
  multivitamin (ONE A DAY) tablet, Take 1 Tab by mouth daily. , Disp: , Rfl:  
  cholecalciferol, vitamin D3, (VITAMIN D3) 2,000 unit tab, Take 1 Tab by mouth daily. , Disp: , Rfl:   
Date Last Reviewed:  1/15/2019 EXAMINATION:  
Observation/Orthostatic Postural Assessment:   
      Magui's deformity noted at the posterior heel. Mild rearfoot eversion present from initial contact to midstance. Minimal navicular drop present through stance. She has early heel rise in stance phase of gait. Palpation:   
      Tender through midsubstance of the achilles and over the posterior heel. ROM:   
 Ankle DF: 10 ° L; 15 ° R Ankle PF: 40 ° B Ankle inversion: WNL Ankle eversion: WNL Great toe extension is mildly restricted Hip rotation is WNL 
 11/26/18 update: Ankle DF: 12 ° L Great toe extension is full Strength: Ankle DF: 5/5 Ankle PF: 4/5 L; 5/5 R Ankle inversion :4+/5 L; 5/5 R Ankle eversion: 5/5 B Hip extension: 4/5 B Hip abduction: 4/5 B Ankle PF: Able to perform seated heel rasie at 75#; performing eccentric heel raises standing Special Tests:   
      Slump and SLR testing are negative for provocation of symptoms. Mild discomfort with overpressure into ankle PF Neurological Screen: 
      Sensation is fully intact Functional Mobility:  
      Independent Balance:   
      Increased hip righting reactions with left single leg stance Body Structures Involved: 1. Nerves 2. Bones 3. Joints 4. Muscles 5. Ligaments Body Functions Affected: 1. Sensory/Pain 2. Neuromusculoskeletal 
3. Movement Related Activities and Participation Affected: 1. Learning and Applying Knowledge 2. General Tasks and Demands 3. Mobility 4. Self Care 5. Domestic Life 6. Interpersonal Interactions and Relationships 7. Community, Social and [place] CLINICAL PRESENTATION:  
CLINICAL DECISION MAKING:  
Outcome Measure: Tool Used: Lower Extremity Functional Scale (LEFS) Score:  Initial: 37/80 Most Recent: X/80 (Date: -- ) Interpretation of Score: 20 questions each scored on a 5 point scale with 0 representing \"extreme difficulty or unable to perform\" and 4 representing \"no difficulty\". The lower the score, the greater the functional disability. 80/80 represents no disability. Minimal detectable change is 9 points. Score 80 79-63 62-48 47-32 31-16 15-1 0 Modifier CH CI CJ CK CL CM CN Medical Necessity:  
· Patient is expected to demonstrate progress in strength, range of motion, balance and coordination to increase independence with community mobility and return to prior level of function. Reason for Services/Other Comments: 
· Patient has demonstrated an improvement in functional level by independent performance of HEP. TREATMENT:  
(In addition to Assessment/Re-Assessment sessions the following treatments were rendered) THERAPEUTIC EXERCISE: (see below for minutes):  Exercises per grid below to improve mobility, strength, balance and coordination. Required minimal verbal and manual cues to promote proper body alignment, promote proper body posture and promote proper body mechanics. Progressed resistance, range, repetitions and complexity of movement as indicated. MANUAL THERAPY: (see below for minutes): Joint mobilization and Soft tissue mobilization was utilized and necessary because of the patient's restricted joint motion, painful spasm, loss of articular motion and restricted motion of soft tissue. MODALITIES: (see below for minutes): To decrease pain Date: 11/21/18 (visit 7) 11/26/18 (visit 8) progress note 11/28/18 (visit 9) 12/18/18 (visit 10) 1/3/19 (visit 11) 1/9/19 (visit 12) 1/11/19 (visit 13) 1/15/19 (visit 14) Modalities:  10 min In cold whirlpool for swelling resolution Therapeutic Exercise: 30 min 30 min 30 min 30 min 30 min 30 min 30 min 30 min Stretching [id number]; with knee rock 15x repeat [id number] self MWM, standing gastroc stretch 2x1 min L1 [id number] self MWM [id number] self MWM, sustained seated stretch at LAQ maching 100# 2 min sustained repeat repeat repeat Bridge Hip abduction Calf raises Seated 87# 3x10; toe raises 3x15 Seated 75# x10; 87# 3x10; SL from incline attempted, SL from floor 2x10, ecc from incline 2x10 repeat Seated 75# 4x10 3-0-3 tempo Seated 75# x10, 100# 3x10 3-0-3 tempo Seated 75# x10; 100# 4x10 repeat Seated 75# x10, 100# 3x10 Inversion/Eversion Black tband 3x15 in and ev repeat repeat Black tband 3x15 each repeat Black tband 3x15 each Black tband 3x20 each Black tband 3x20 each Lateral band walks Great toe flexion 3x20 green tband LAQ        25# B 3x10 Squat  Single leg from 6\" step 3x10 repeat     Goblet from lo mat 10# 3x10 NuStep 5 min L4 5 min L4 repeat 5 min L4 5 min L4 5 min L4 5 min L4 5 min L4 Proprioceptive Activities:          
          
          
          
Manual Therapy: 15 min 15 min 15 min 15 min 15 min 15 min 15 min 15 min Soft tissue mobilization repeat repeat repeat STM to posterior calf, posterior tibialis; lateral gastroc repeat repeat STM to posterior calf, posterior tib, lateral gastroc, transverse friction to distal achilles repeat Therapeutic Activities: HEP: see 10/31/18 flow sheet for specifics. Educated on managing load of calf raises based on symptom response. [facility] Portal 
Treatment/Session Assessment: Magui's deformity remains, but she has significantly less swelling at the posterior ankle.  Gait pattern is symmetric. Introduced to lower extremity activities to perform at gym to improve load capacity of the lower extremity and off load the achilles. · Pain/ Symptoms: Initial:   1/10 \"It's doing much better. I don't really think about it during the day. I've been exercising a bit and lost a little weight and I'm feeling really good. \" Post Session:  No increase following today's treatment session. ·  
Compliance with Program/Exercises: Will assess as treatment progresses. · Recommendations/Intent for next treatment session: \"Next visit will focus on advancements to more challenging activities\". Total Treatment Duration: PT Patient Time In/Time Out Time In: 2580 Time Out: 1615 Andrea Hall DPT

## 2019-01-17 ENCOUNTER — HOSPITAL ENCOUNTER (OUTPATIENT)
Dept: PHYSICAL THERAPY | Age: 49
Discharge: HOME | End: 2019-01-17
Payer: COMMERCIAL

## 2019-01-17 PROCEDURE — 97110 THERAPEUTIC EXERCISES: CPT

## 2019-01-17 PROCEDURE — 97140 MANUAL THERAPY 1/> REGIONS: CPT

## 2019-01-17 NOTE — THERAPY EVALUATION
Chra Michele : 1970 Dave Johnson @ [address] [address]. [phone]   [phone] OUTPATIENT PHYSICAL THERAPY:Daily Note 2019 ICD-10: Treatment Diagnosis: Pain in left ankle and joints of left foot (M25.572); Difficulty in walking, not elsewhere classified (R26.2) Precautions/Allergies:  
Patient has no known allergies. Fall Risk Score: 0 (? 5 = High Risk) MD Orders: evaluate and treat MEDICAL/REFERRING DIAGNOSIS: 
Pain in left ankle and joints of left foot [M25.572] DATE OF ONSET: chronic history with worsening over the past 6 weeks REFERRING PHYSICIAN: Oly Park MD 
RETURN PHYSICIAN APPOINTMENT: 18 INITIAL ASSESSMENT:  Ms. Shawna Bain presents to therapy with left ankle pain secondary to midsubstance achilles tendinosis with braden's deformity. She has restricted calf flexibility. She has weakness through the calf musculature, intrinsic foot musculature, and poor motor control of the hip musculature. Symptoms are moderately reactive. Skilled therapy is required to return to prior level of function. PROBLEM LIST (Impacting functional limitations): 1. Decreased Strength 2. Decreased ADL/Functional Activities 3. Decreased Ambulation Ability/Technique 4. Decreased Balance 5. Increased Pain 6. Decreased Activity Tolerance 7. Decreased Flexibility/Joint Mobility INTERVENTIONS PLANNED: 
1. Family Education 2. Gait Training 3. Home Exercise Program (HEP) 4. Manual Therapy 5. Neuromuscular Re-education/Strengthening 6. Range of Motion (ROM) 7. Therapeutic Activites 8. Therapeutic Exercise/Strengthening 9. modalities TREATMENT PLAN: 
Effective Dates: 10/31/2018 TO 2019 (90 days). Frequency/Duration: 2 times a week for 90 Days GOALS: (Goals have been discussed and agreed upon with patient.) Short-Term Functional Goals: Time Frame: 2 weeks 1. Patient will be independent with HEP. MET 2. Patient will report pain <2/10 with daily activity. PROGRESSING Discharge Goals: Time Frame: 6 weeks 1. Patient will report no pain with daily activity to perform work duties without use of boot. NOT MET 2. Patient will be able to perform 10 single leg heel raises through full ROM to restore functional calf strength. NOT MET 3. Patient will improve ankle DF to 15 ° to decrease strain on the achilles with daily activity such as stair descent. NOT MET Rehabilitation Potential For Stated Goals: Excellent Regarding Consuelo Verdugo therapy, I certify that the treatment plan above will be carried out by a therapist or under their direction. Thank you for this referral, Gregoria Echavarria, DPT HISTORY:  
History of Present Injury/Illness (Reason for Referral): 
Patient presents to therapy with primary complaint of left posterior heel pain. She notes a chronic history of heel pain that has been worsening over the past 6 weeks. She has recently had excruciating pain in the morning, pain with end range movements into passive plantar flexion, and pain with general walking activities. 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 Since her initial MD consult, she has begun to use a walking boot during the day and a compressive sock in the evening. This has significantly been helping. She has also begun to take meloxicam which has also been helping. Past Medical History/Comorbidities:  
Ms. Betzy Garcia  has a past medical history of Morbid obesity ([address] Nausea & vomiting, Psychiatric disorder, and Unspecified adverse effect of anesthesia.  She also has no past medical history of Aneurysm ([address] Arrhythmia, Arthritis, Asthma, Autoimmune disease ([address] CAD (coronary artery disease), Cancer ([address] Chronic kidney disease, Chronic obstructive pulmonary disease ([address] Chronic pain, Coagulation disorder ([address] Diabetes ([address] Difficult intubation, GERD (gastroesophageal reflux disease), Heart failure ([address] Hypertension, Liver disease, Malignant hyperthermia due to anesthesia, Other ill-defined conditions(799.89), Pseudocholinesterase deficiency, PUD (peptic ulcer disease), Seizures ([address] Stroke ([address] Thromboembolus [address] [address] Thyroid disease, or Unspecified sleep apnea. Ms. Regina Liang  has a past surgical history that includes hx  section and hx other surgical (, ). Social History/Living Environment:  
  Patient lives at home with her family. Prior Level of Function/Work/Activity: 
Patient is a . Dominant Side:  
      RIGHT Current Medications:   
Current Outpatient Medications:  
  ondansetron (ZOFRAN ODT) 4 mg disintegrating tablet, Take 4 mg by mouth every eight (8) hours as needed for Nausea., Disp: , Rfl:  
  Desvenlafaxine (PRISTIQ) 100 mg Tb24, Take 1 Tab by mouth nightly., Disp: , Rfl:  
  multivitamin (ONE A DAY) tablet, Take 1 Tab by mouth daily. , Disp: , Rfl:  
  cholecalciferol, vitamin D3, (VITAMIN D3) 2,000 unit tab, Take 1 Tab by mouth daily. , Disp: , Rfl:   
Date Last Reviewed:  2019 EXAMINATION:  
Observation/Orthostatic Postural Assessment:   
      Magui's deformity noted at the posterior heel. Mild rearfoot eversion present from initial contact to midstance. Minimal navicular drop present through stance. She has early heel rise in stance phase of gait. Palpation:   
      Tender through midsubstance of the achilles and over the posterior heel. ROM:   
 Ankle DF: 10 ° L; 15 ° R Ankle PF: 40 ° B Ankle inversion: WNL Ankle eversion: WNL Great toe extension is mildly restricted Hip rotation is WNL 
 18 update: Ankle DF: 12 ° L Great toe extension is full Strength: Ankle DF: 5/5 Ankle PF: 4/5 L; 5/5 R Ankle inversion :4+/5 L; 5/5 R Ankle eversion: 5/5 B Hip extension: 4/5 B Hip abduction: 4/5 B 
 Ankle PF: Able to perform seated heel rasie at 75#; performing eccentric heel raises standing Special Tests:   
      Slump and SLR testing are negative for provocation of symptoms. Mild discomfort with overpressure into ankle PF Neurological Screen: 
      Sensation is fully intact Functional Mobility:  
      Independent Balance:   
      Increased hip righting reactions with left single leg stance Body Structures Involved: 1. Nerves 2. Bones 3. Joints 4. Muscles 5. Ligaments Body Functions Affected: 1. Sensory/Pain 2. Neuromusculoskeletal 
3. Movement Related Activities and Participation Affected: 1. Learning and Applying Knowledge 2. General Tasks and Demands 3. Mobility 4. Self Care 5. Domestic Life 6. Interpersonal Interactions and Relationships 7. Community, Social and [place] CLINICAL PRESENTATION:  
CLINICAL DECISION MAKING:  
Outcome Measure: Tool Used: Lower Extremity Functional Scale (LEFS) Score:  Initial: 37/80 Most Recent: X/80 (Date: -- ) Interpretation of Score: 20 questions each scored on a 5 point scale with 0 representing \"extreme difficulty or unable to perform\" and 4 representing \"no difficulty\". The lower the score, the greater the functional disability. 80/80 represents no disability. Minimal detectable change is 9 points. Score 80 79-63 62-48 47-32 31-16 15-1 0 Modifier CH CI CJ CK CL CM CN Medical Necessity:  
· Patient is expected to demonstrate progress in strength, range of motion, balance and coordination to increase independence with community mobility and return to prior level of function. Reason for Services/Other Comments: 
· Patient has demonstrated an improvement in functional level by independent performance of HEP. TREATMENT:  
(In addition to Assessment/Re-Assessment sessions the following treatments were rendered) THERAPEUTIC EXERCISE: (see below for minutes):  Exercises per grid below to improve mobility, strength, balance and coordination. Required minimal verbal and manual cues to promote proper body alignment, promote proper body posture and promote proper body mechanics. Progressed resistance, range, repetitions and complexity of movement as indicated. MANUAL THERAPY: (see below for minutes): Joint mobilization and Soft tissue mobilization was utilized and necessary because of the patient's restricted joint motion, painful spasm, loss of articular motion and restricted motion of soft tissue. MODALITIES: (see below for minutes): To decrease pain Date: 11/21/18 (visit 7) 11/26/18 (visit 8) progress note 11/28/18 (visit 9) 12/18/18 (visit 10) 1/3/19 (visit 11) 1/9/19 (visit 12) 1/11/19 (visit 13) 1/15/19 (visit 14) 1/17/19 (visit 15) Modalities:  10 min In cold whirlpool for swelling resolution Therapeutic Exercise: 30 min 30 min 30 min 30 min 30 min 30 min 30 min 30 min 30 min Stretching [id number]; with knee rock 15x repeat [id number] self MWM, standing gastroc stretch 2x1 min L1 [id number] self MWM [id number] self MWM, sustained seated stretch at LAQ maching 100# 2 min sustained repeat repeat repeat [id number] self MWM; seated soleus stretch at LAQ machine Bridge Hip abduction Calf raises Seated 87# 3x10; toe raises 3x15 Seated 75# x10; 87# 3x10; SL from incline attempted, SL from floor 2x10, ecc from incline 2x10 repeat Seated 75# 4x10 3-0-3 tempo Seated 75# x10, 100# 3x10 3-0-3 tempo Seated 75# x10; 100# 4x10 repeat Seated 75# x10, 100# 3x10 Seated 100# x10, 125# 3x10 Inversion/Eversion Black tband 3x15 in and ev repeat repeat Black tband 3x15 each repeat Black tband 3x15 each Black tband 3x20 each Black tband 3x20 each Black tband 3x20 each Lateral band walks Great toe flexion 3x20 green tband LAQ        25# B 3x10 Squat  Single leg from 6\" step 3x10 repeat     Goblet from lo mat 10# 3x10 NuStep 5 min L4 5 min L4 repeat 5 min L4 5 min L4 5 min L4 5 min L4 5 min L4 5 min L4 Proprioceptive Activities:           
           
           
           
Manual Therapy: 15 min 15 min 15 min 15 min 15 min 15 min 15 min 15 min 15 min Soft tissue mobilization repeat repeat repeat STM to posterior calf, posterior tibialis; lateral gastroc repeat repeat STM to posterior calf, posterior tib, lateral gastroc, transverse friction to distal achilles repeat repeat Therapeutic Activities: HEP: see 10/31/18 flow sheet for specifics. Educated on managing load of calf raises based on symptom response. [facility] Portal 
Treatment/Session Assessment: Demonstrates symmetric ankle rocker through stance phase of gait with even step length. Continued strength of the calf is necessary to return to prior level of function for activities such as walking up or down hill. · Pain/ Symptoms: Initial:   1/10 \"I'm feeling really good. It still feels tight and i'm still stretching at work, but i'm much more comfortable with walking. \" Post Session:  No increase following today's treatment session. ·  
Compliance with Program/Exercises: Will assess as treatment progresses. · Recommendations/Intent for next treatment session: \"Next visit will focus on advancements to more challenging activities\". Total Treatment Duration: PT Patient Time In/Time Out Time In: 4197 Time Out: 3213 Parish Solano DPT

## 2019-01-22 ENCOUNTER — HOSPITAL ENCOUNTER (OUTPATIENT)
Dept: PHYSICAL THERAPY | Age: 49
Discharge: HOME | End: 2019-01-22
Payer: COMMERCIAL

## 2019-01-24 ENCOUNTER — APPOINTMENT (OUTPATIENT)
Dept: PHYSICAL THERAPY | Age: 49
End: 2019-01-24
Payer: COMMERCIAL

## 2019-01-29 ENCOUNTER — HOSPITAL ENCOUNTER (OUTPATIENT)
Dept: PHYSICAL THERAPY | Age: 49
Discharge: HOME | End: 2019-01-29
Payer: COMMERCIAL

## 2019-01-29 PROCEDURE — 97140 MANUAL THERAPY 1/> REGIONS: CPT

## 2019-01-29 PROCEDURE — 97110 THERAPEUTIC EXERCISES: CPT

## 2019-01-29 NOTE — PROGRESS NOTES
Isis Monson : 1970 [address] [place] [address],[address] @ [address] [facility] [address] [phone]   [phone] OUTPATIENT PHYSICAL THERAPY:Daily Note 2019 ICD-10: Treatment Diagnosis: Pain in left ankle and joints of left foot (M25.572); Difficulty in walking, not elsewhere classified (R26.2) Precautions/Allergies:  
Patient has no known allergies. Fall Risk Score: 0 (? 5 = High Risk) MD Orders: evaluate and treat MEDICAL/REFERRING DIAGNOSIS: 
Pain in left ankle and joints of left foot [M25.572] DATE OF ONSET: chronic history with worsening over the past 6 weeks REFERRING PHYSICIAN: Nyasia Sifuentes MD 
RETURN PHYSICIAN APPOINTMENT: 18 INITIAL ASSESSMENT:  Ms. Rashida Galdamez presents to therapy with left ankle pain secondary to midsubstance achilles tendinosis with braden's deformity. She has restricted calf flexibility. She has weakness through the calf musculature, intrinsic foot musculature, and poor motor control of the hip musculature. Symptoms are moderately reactive. Skilled therapy is required to return to prior level of function. PROBLEM LIST (Impacting functional limitations): 1. Decreased Strength 2. Decreased ADL/Functional Activities 3. Decreased Ambulation Ability/Technique 4. Decreased Balance 5. Increased Pain 6. Decreased Activity Tolerance 7. Decreased Flexibility/Joint Mobility INTERVENTIONS PLANNED: 
1. Family Education 2. Gait Training 3. Home Exercise Program (HEP) 4. Manual Therapy 5. Neuromuscular Re-education/Strengthening 6. Range of Motion (ROM) 7. Therapeutic Activites 8. Therapeutic Exercise/Strengthening 9. modalities TREATMENT PLAN: 
Effective Dates: 10/31/2018 TO 2019 (90 days). Frequency/Duration: 2 times a week for 90 Days GOALS: (Goals have been discussed and agreed upon with patient.) Short-Term Functional Goals: Time Frame: 2 weeks 1. Patient will be independent with HEP. MET 2. Patient will report pain <2/10 with daily activity. PROGRESSING Discharge Goals: Time Frame: 6 weeks 1. Patient will report no pain with daily activity to perform work duties without use of boot. NOT MET 2. Patient will be able to perform 10 single leg heel raises through full ROM to restore functional calf strength. NOT MET 3. Patient will improve ankle DF to 15 ° to decrease strain on the achilles with daily activity such as stair descent. NOT MET Rehabilitation Potential For Stated Goals: Excellent Regarding Consuelo Kartik therapy, I certify that the treatment plan above will be carried out by a therapist or under their direction. Thank you for this referral, Laurita Middleton DPT HISTORY:  
History of Present Injury/Illness (Reason for Referral): 
Patient presents to therapy with primary complaint of left posterior heel pain. She notes a chronic history of heel pain that has been worsening over the past 6 weeks. She has recently had excruciating pain in the morning, pain with end range movements into passive plantar flexion, and pain with general walking activities. 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 Since her initial MD consult, she has begun to use a walking boot during the day and a compressive sock in the evening. This has significantly been helping. She has also begun to take meloxicam which has also been helping. Past Medical History/Comorbidities:  
Ms. Juan Carlos Lechuga  has a past medical history of Morbid obesity ([address] Nausea & vomiting, Psychiatric disorder, and Unspecified adverse effect of anesthesia.  She also has no past medical history of Aneurysm ([address] Arrhythmia, Arthritis, Asthma, Autoimmune disease ([address] CAD (coronary artery disease), Cancer ([address] Chronic kidney disease, Chronic obstructive pulmonary disease ([address] Chronic pain, Coagulation disorder ([address] Diabetes ([address] Difficult intubation, GERD (gastroesophageal reflux disease), Heart failure ([address] Hypertension, Liver disease, Malignant hyperthermia due to anesthesia, Other ill-defined conditions(799.89), Pseudocholinesterase deficiency, PUD (peptic ulcer disease), Seizures ([address] Stroke ([address] Thromboembolus ([address] Thyroid disease, or Unspecified sleep apnea. Ms. Maddy Velazquez  has a past surgical history that includes hx  section and hx other surgical (, ). Social History/Living Environment:  
  Patient lives at home with her family. Prior Level of Function/Work/Activity: 
Patient is a . Dominant Side:  
      RIGHT Current Medications:   
Current Outpatient Medications:  
  ondansetron (ZOFRAN ODT) 4 mg disintegrating tablet, Take 4 mg by mouth every eight (8) hours as needed for Nausea., Disp: , Rfl:  
  Desvenlafaxine (PRISTIQ) 100 mg Tb24, Take 1 Tab by mouth nightly., Disp: , Rfl:  
  multivitamin (ONE A DAY) tablet, Take 1 Tab by mouth daily. , Disp: , Rfl:  
  cholecalciferol, vitamin D3, (VITAMIN D3) 2,000 unit tab, Take 1 Tab by mouth daily. , Disp: , Rfl:   
Date Last Reviewed:  2019 EXAMINATION:  
Observation/Orthostatic Postural Assessment:   
      Magui's deformity noted at the posterior heel. Mild rearfoot eversion present from initial contact to midstance. Minimal navicular drop present through stance. She has early heel rise in stance phase of gait. Palpation:   
      Tender through midsubstance of the achilles and over the posterior heel. ROM:   
 Ankle DF: 10 ° L; 15 ° R Ankle PF: 40 ° B Ankle inversion: WNL Ankle eversion: WNL Great toe extension is mildly restricted Hip rotation is WNL 
 18 update: Ankle DF: 12 ° L Great toe extension is full Strength: Ankle DF: 5/5 Ankle PF: 4/5 L; 5/5 R Ankle inversion :4+/5 L; 5/5 R Ankle eversion: 5/5 B Hip extension: 4/5 B Hip abduction: 4/5 B 
 Ankle PF: Able to perform seated heel rasie at 75#; performing eccentric heel raises standing Special Tests:   
      Slump and SLR testing are negative for provocation of symptoms. Mild discomfort with overpressure into ankle PF Neurological Screen: 
      Sensation is fully intact Functional Mobility:  
      Independent Balance:   
      Increased hip righting reactions with left single leg stance Body Structures Involved: 1. Nerves 2. Bones 3. Joints 4. Muscles 5. Ligaments Body Functions Affected: 1. Sensory/Pain 2. Neuromusculoskeletal 
3. Movement Related Activities and Participation Affected: 1. Learning and Applying Knowledge 2. General Tasks and Demands 3. Mobility 4. Self Care 5. Domestic Life 6. Interpersonal Interactions and Relationships 7. Community, Social and [place] CLINICAL PRESENTATION:  
CLINICAL DECISION MAKING:  
Outcome Measure: Tool Used: Lower Extremity Functional Scale (LEFS) Score:  Initial: 37/80 Most Recent: X/80 (Date: -- ) Interpretation of Score: 20 questions each scored on a 5 point scale with 0 representing \"extreme difficulty or unable to perform\" and 4 representing \"no difficulty\". The lower the score, the greater the functional disability. 80/80 represents no disability. Minimal detectable change is 9 points. Score 80 79-63 62-48 47-32 31-16 15-1 0 Modifier CH CI CJ CK CL CM CN Medical Necessity:  
· Patient is expected to demonstrate progress in strength, range of motion, balance and coordination to increase independence with community mobility and return to prior level of function. Reason for Services/Other Comments: 
· Patient has demonstrated an improvement in functional level by independent performance of HEP. TREATMENT:  
(In addition to Assessment/Re-Assessment sessions the following treatments were rendered) THERAPEUTIC EXERCISE: (see below for minutes):  Exercises per grid below to improve mobility, strength, balance and coordination. Required minimal verbal and manual cues to promote proper body alignment, promote proper body posture and promote proper body mechanics. Progressed resistance, range, repetitions and complexity of movement as indicated. MANUAL THERAPY: (see below for minutes): Joint mobilization and Soft tissue mobilization was utilized and necessary because of the patient's restricted joint motion, painful spasm, loss of articular motion and restricted motion of soft tissue. MODALITIES: (see below for minutes): To decrease pain Date: 11/21/18 (visit 7) 11/26/18 (visit 8) progress note 11/28/18 (visit 9) 12/18/18 (visit 10) 1/3/19 (visit 11) 1/9/19 (visit 12) 1/11/19 (visit 13) 1/15/19 (visit 14) 1/17/19 (visit 15) 1/29/19 (visit 16) Modalities:  10 min In cold whirlpool for swelling resolution Therapeutic Exercise: 30 min 30 min 30 min 30 min 30 min 30 min 30 min 30 min 30 min 30 min Stretching [id number]; with knee rock 15x repeat [id number] self MWM, standing gastroc stretch 2x1 min L1 [id number] self MWM [id number] self MWM, sustained seated stretch at LAQ maching 100# 2 min sustained repeat repeat repeat [id number] self MWM; seated soleus stretch at LAQ machine repeat Bridge Hip abduction Calf raises Seated 87# 3x10; toe raises 3x15 Seated 75# x10; 87# 3x10; SL from incline attempted, SL from floor 2x10, ecc from incline 2x10 repeat Seated 75# 4x10 3-0-3 tempo Seated 75# x10, 100# 3x10 3-0-3 tempo Seated 75# x10; 100# 4x10 repeat Seated 75# x10, 100# 3x10 Seated 100# x10, 125# 3x10 Seated 75# 3x15; 100# 3x20 Inversion/Eversion Black tband 3x15 in and ev repeat repeat Black tband 3x15 each repeat Black tband 3x15 each Black tband 3x20 each Black tband 3x20 each Black tband 3x20 each repeat Lateral band walks Great toe flexion 3x20 green tband LAQ        25# B 3x10 Squat  Single leg from 6\" step 3x10 repeat     Goblet from lo mat 10# 3x10 NuStep 5 min L4 5 min L4 repeat 5 min L4 5 min L4 5 min L4 5 min L4 5 min L4 5 min L4 5 min L4 Proprioceptive Activities:            
            
            
            
Manual Therapy: 15 min 15 min 15 min 15 min 15 min 15 min 15 min 15 min 15 min 15 min Soft tissue mobilization repeat repeat repeat STM to posterior calf, posterior tibialis; lateral gastroc repeat repeat STM to posterior calf, posterior tib, lateral gastroc, transverse friction to distal achilles repeat repeat repeat Therapeutic Activities: HEP: see 10/31/18 flow sheet for specifics. Educated on managing load of calf raises based on symptom response. [facility] Portal 
Treatment/Session Assessment: Will continue x 1 visit as she is becoming increasingly independent with her home program. Continues to have irritability to tenderness with deep pressure, but ankle mobility has significantly improved. · Pain/ Symptoms: Initial:   1/10 Patient was unable to attend last week due to a death in the family. Notes she rarely notices the heel in the morning and stretching continues to decrease irritability. Post Session:  No increase following today's treatment session. ·  
Compliance with Program/Exercises: Will assess as treatment progresses. · Recommendations/Intent for next treatment session: \"Next visit will focus on advancements to more challenging activities\". Total Treatment Duration: PT Patient Time In/Time Out Time In: 3626 Time Out: 6727 Chris Conner DPT

## 2019-01-31 ENCOUNTER — HOSPITAL ENCOUNTER (OUTPATIENT)
Dept: PHYSICAL THERAPY | Age: 49
Discharge: HOME | End: 2019-01-31
Payer: COMMERCIAL

## 2019-01-31 PROCEDURE — 97110 THERAPEUTIC EXERCISES: CPT

## 2019-01-31 PROCEDURE — 97140 MANUAL THERAPY 1/> REGIONS: CPT

## 2019-01-31 NOTE — PROGRESS NOTES
Salome Johnson : 1970 [address] @ [address] [address]. [phone]   [phone] OUTPATIENT PHYSICAL THERAPY:Daily Note and Discharge 2019 ICD-10: Treatment Diagnosis: Pain in left ankle and joints of left foot (M25.572); Difficulty in walking, not elsewhere classified (R26.2) Precautions/Allergies:  
Patient has no known allergies. Fall Risk Score: 0 (? 5 = High Risk) MD Orders: evaluate and treat MEDICAL/REFERRING DIAGNOSIS: 
Pain in left ankle and joints of left foot [M25.572] DATE OF ONSET: chronic history with worsening over the past 6 weeks REFERRING PHYSICIAN: Noelle Valentin MD 
RETURN PHYSICIAN APPOINTMENT: 18 INITIAL ASSESSMENT:  Ms. Darryn Pacheco presents to therapy with left ankle pain secondary to midsubstance achilles tendinosis with braden's deformity. She has restricted calf flexibility. She has weakness through the calf musculature, intrinsic foot musculature, and poor motor control of the hip musculature. Symptoms are moderately reactive. Skilled therapy is required to return to prior level of function. PROBLEM LIST (Impacting functional limitations): 1. Decreased Strength 2. Decreased ADL/Functional Activities 3. Decreased Ambulation Ability/Technique 4. Decreased Balance 5. Increased Pain 6. Decreased Activity Tolerance 7. Decreased Flexibility/Joint Mobility INTERVENTIONS PLANNED: 
1. Family Education 2. Gait Training 3. Home Exercise Program (HEP) 4. Manual Therapy 5. Neuromuscular Re-education/Strengthening 6. Range of Motion (ROM) 7. Therapeutic Activites 8. Therapeutic Exercise/Strengthening 9. modalities TREATMENT PLAN: 
Effective Dates: 10/31/2018 TO 2019 (90 days). Frequency/Duration: 2 times a week for 90 Days GOALS: (Goals have been discussed and agreed upon with patient.) Short-Term Functional Goals: Time Frame: 2 weeks 1. Patient will be independent with HEP. MET 2. Patient will report pain <2/10 with daily activity. MET Discharge Goals: Time Frame: 6 weeks 1. Patient will report no pain with daily activity to perform work duties without use of boot. MET 2. Patient will be able to perform 10 single leg heel raises through full ROM to restore functional calf strength. MET 3. Patient will improve ankle DF to 15 ° to decrease strain on the achilles with daily activity such as stair descent. MET Rehabilitation Potential For Stated Goals: Excellent Regarding Consuelo Verdugo therapy, I certify that the treatment plan above will be carried out by a therapist or under their direction. Thank you for this referral, Matthew Burdick DPT HISTORY:  
History of Present Injury/Illness (Reason for Referral): 
Patient presents to therapy with primary complaint of left posterior heel pain. She notes a chronic history of heel pain that has been worsening over the past 6 weeks. She has recently had excruciating pain in the morning, pain with end range movements into passive plantar flexion, and pain with general walking activities. 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 Since her initial MD consult, she has begun to use a walking boot during the day and a compressive sock in the evening. This has significantly been helping. She has also begun to take meloxicam which has also been helping. Past Medical History/Comorbidities:  
Ms. Nadeem Samuel  has a past medical history of Morbid obesity ([address] Nausea & vomiting, Psychiatric disorder, and Unspecified adverse effect of anesthesia.  She also has no past medical history of Aneurysm ([address] Arrhythmia, Arthritis, Asthma, Autoimmune disease ([address] CAD (coronary artery disease), Cancer ([address] Chronic kidney disease, Chronic obstructive pulmonary disease ([address] Chronic pain, Coagulation disorder ([address] Diabetes ([facility] [address] Difficult intubation, GERD (gastroesophageal reflux disease), Heart failure ([address] Hypertension, Liver disease, Malignant hyperthermia due to anesthesia, Other ill-defined conditions(799.89), Pseudocholinesterase deficiency, PUD (peptic ulcer disease), Seizures ([address] Stroke ([address] Thromboembolus ([facility] [address] Thyroid disease, or Unspecified sleep apnea. Ms. Shantell Solano  has a past surgical history that includes hx  section and hx other surgical (, ). Social History/Living Environment:  
  Patient lives at home with her family. Prior Level of Function/Work/Activity: 
Patient is a . Dominant Side:  
      RIGHT Current Medications:   
Current Outpatient Medications:  
  ondansetron (ZOFRAN ODT) 4 mg disintegrating tablet, Take 4 mg by mouth every eight (8) hours as needed for Nausea., Disp: , Rfl:  
  Desvenlafaxine (PRISTIQ) 100 mg Tb24, Take 1 Tab by mouth nightly., Disp: , Rfl:  
  multivitamin (ONE A DAY) tablet, Take 1 Tab by mouth daily. , Disp: , Rfl:  
  cholecalciferol, vitamin D3, (VITAMIN D3) 2,000 unit tab, Take 1 Tab by mouth daily. , Disp: , Rfl:   
Date Last Reviewed:  2019 EXAMINATION:  
Observation/Orthostatic Postural Assessment:   
    WNL Palpation:   
      Tender through midsubstance of the achilles and over the posterior heel. 19 update: no palpable tenderness ROM:   
 Ankle DF: 10 ° L; 15 ° R Ankle PF: 40 ° B Ankle inversion: WNL Ankle eversion: WNL Great toe extension is mildly restricted Hip rotation is WNL 
 18 update: Ankle DF: 12 ° L Great toe extension is full 19 update: Ankle DF: 15 ° L Strength: Ankle DF: 5/5 Ankle PF: 4/5 L; 5/5 R Ankle inversion :4+/5 L; 5/5 R Ankle eversion: 5/5 B Hip extension: 4/5 B Hip abduction: 4/5 B Ankle PF: Able to perform seated heel rasie at 75#; performing eccentric heel raises standing 19 update:  
Performs seated heel raise with 125# Able to perform 10 single leg heel raises Special Tests:   
      Slump and SLR testing are negative for provocation of symptoms. Mild discomfort with overpressure into ankle PF 1/31/19 update: Painfree Neurological Screen: 
      Sensation is fully intact Functional Mobility:  
      Independent Balance:   
      Increased hip righting reactions with left single leg stance Body Structures Involved: 1. Nerves 2. Bones 3. Joints 4. Muscles 5. Ligaments Body Functions Affected: 1. Sensory/Pain 2. Neuromusculoskeletal 
3. Movement Related Activities and Participation Affected: 1. Learning and Applying Knowledge 2. General Tasks and Demands 3. Mobility 4. Self Care 5. Domestic Life 6. Interpersonal Interactions and Relationships 7. Community, Social and [place] CLINICAL PRESENTATION:  
CLINICAL DECISION MAKING:  
Outcome Measure: Tool Used: Lower Extremity Functional Scale (LEFS) Score:  Initial: 37/80 Most Recent: X/80 (Date: -- ) Interpretation of Score: 20 questions each scored on a 5 point scale with 0 representing \"extreme difficulty or unable to perform\" and 4 representing \"no difficulty\". The lower the score, the greater the functional disability. 80/80 represents no disability. Minimal detectable change is 9 points. Score 80 79-63 62-48 47-32 31-16 15-1 0 Modifier CH CI CJ CK CL CM CN Medical Necessity:  
· Patient is expected to demonstrate progress in strength, range of motion, balance and coordination to increase independence with community mobility and return to prior level of function. Reason for Services/Other Comments: 
· Patient has demonstrated an improvement in functional level by independent performance of HEP. TREATMENT:  
(In addition to Assessment/Re-Assessment sessions the following treatments were rendered) THERAPEUTIC EXERCISE: (see below for minutes):  Exercises per grid below to improve mobility, strength, balance and coordination. Required minimal verbal and manual cues to promote proper body alignment, promote proper body posture and promote proper body mechanics. Progressed resistance, range, repetitions and complexity of movement as indicated. MANUAL THERAPY: (see below for minutes): Joint mobilization and Soft tissue mobilization was utilized and necessary because of the patient's restricted joint motion, painful spasm, loss of articular motion and restricted motion of soft tissue. MODALITIES: (see below for minutes): To decrease pain Date: 11/21/18 (visit 7) 11/26/18 (visit 8) progress note 11/28/18 (visit 9) 12/18/18 (visit 10) 1/3/19 (visit 11) 1/9/19 (visit 12) 1/11/19 (visit 13) 1/15/19 (visit 14) 1/17/19 (visit 15) 1/29/19 (visit 16) 1/31/19 (visit 16) Modalities:  10 min In cold whirlpool for swelling resolution Therapeutic Exercise: 30 min 30 min 30 min 30 min 30 min 30 min 30 min 30 min 30 min 30 min 30 min Stretching [id number]; with knee rock 15x repeat [id number] self MWM, standing gastroc stretch 2x1 min L1 [id number] self MWM [id number] self MWM, sustained seated stretch at LAQ maching 100# 2 min sustained repeat repeat repeat [id number] self MWM; seated soleus stretch at LAQ machine repeat repeat Bridge Hip abduction Calf raises Seated 87# 3x10; toe raises 3x15 Seated 75# x10; 87# 3x10; SL from incline attempted, SL from floor 2x10, ecc from incline 2x10 repeat Seated 75# 4x10 3-0-3 tempo Seated 75# x10, 100# 3x10 3-0-3 tempo Seated 75# x10; 100# 4x10 repeat Seated 75# x10, 100# 3x10 Seated 100# x10, 125# 3x10 Seated 75# 3x15; 100# 3x20 repeat Inversion/Eversion Black tband 3x15 in and ev repeat repeat Black tband 3x15 each repeat Black tband 3x15 each Black tband 3x20 each Black tband 3x20 each Black tband 3x20 each repeat repeat Lateral band walks Great toe flexion 3x20 green tband LAQ        25# B 3x10 Squat  Single leg from 6\" step 3x10 repeat     Goblet from lo mat 10# 3x10 NuStep 5 min L4 5 min L4 repeat 5 min L4 5 min L4 5 min L4 5 min L4 5 min L4 5 min L4 5 min L4 5 min L4 Proprioceptive Activities:             
             
             
             
Manual Therapy: 15 min 15 min 15 min 15 min 15 min 15 min 15 min 15 min 15 min 15 min 15 min Soft tissue mobilization repeat repeat repeat STM to posterior calf, posterior tibialis; lateral gastroc repeat repeat STM to posterior calf, posterior tib, lateral gastroc, transverse friction to distal achilles repeat repeat repeat Repeat; focusing on junction of gastroc-soleus Therapeutic Activities: HEP: see 10/31/18 flow sheet for specifics. Educated on managing load of calf raises based on symptom response. [employer] 
Treatment/Session Assessment: Patient met all goals and returned to prior level of function. Will discharge to home program.  
 
· Pain/ Symptoms: Initial:   0/10 \"It's feeling really good. I don't really notice it any more. It's  if I smack my heel on something, but otherwise I don't think about it. \" Post Session:  No increase following today's treatment session. ·  
Compliance with Program/Exercises: Will assess as treatment progresses. · Recommendations/Intent for next treatment session: \"Next visit will focus on advancements to more challenging activities\". Total Treatment Duration: PT Patient Time In/Time Out Time In: 0892 Time Out: 1615 Jessica Drake DPT

## 2020-02-18 ENCOUNTER — HOSPITAL ENCOUNTER (OUTPATIENT)
Dept: NUCLEAR MEDICINE | Age: 50
Discharge: HOME | End: 2020-02-18
Attending: INTERNAL MEDICINE
Payer: COMMERCIAL

## 2020-02-18 ENCOUNTER — HOSPITAL ENCOUNTER (OUTPATIENT)
Dept: ULTRASOUND IMAGING | Age: 50
Discharge: HOME | End: 2020-02-18
Attending: INTERNAL MEDICINE
Payer: COMMERCIAL

## 2020-02-18 DIAGNOSIS — E21.0 PRIMARY HYPERPARATHYROIDISM (HCC): ICD-10-CM

## 2020-02-18 PROCEDURE — 78071 PARATHYRD PLANAR W/WO SUBTRJ: CPT

## 2020-02-18 PROCEDURE — 76536 US EXAM OF HEAD AND NECK: CPT

## 2020-02-20 NOTE — PROGRESS NOTES
Ms. David Wagner, the parathyroid nuclear scan that you did the last couple of days shows a probable parathyroid adenoma (enlarged parathyroid gland) just above the thyroid gland. Additionally, there is a 2.6 cm nodule on the lower half of your left thyroid lobe. It is most likely benign and is unrelated to the parathyroid issue, but I do recommend that that nodule be biopsied prior to your having parathyroid surgery. If you are agreeable, I am happy to get you back in at your earliest convenience to have that done. The biopsy is a simple needle biopsy done through the skin overlying the thyroid and takes about 5 minutes. Just let me know if you are agreeable and I will get you back in so that I can get that done.

## 2020-02-20 NOTE — PROGRESS NOTES
Ms. Ha Begin, the parathyroid nuclear scan that you did the last couple of days shows a probable parathyroid adenoma (enlarged parathyroid gland) just above the thyroid gland. Additionally, there is a 2.6 cm nodule on the lower half of your left thyroid lobe. It is most likely benign and is unrelated to the parathyroid issue, but I do recommend that that nodule be biopsied prior to your having parathyroid surgery. If you are agreeable, I am happy to get you back in at your earliest convenience to have that done. The biopsy is a simple needle biopsy done through the skin overlying the thyroid and takes about 5 minutes. Just let me know if you are agreeable and I will get you back in so that I can get that done.

## 2022-05-26 DIAGNOSIS — Z86.39 HISTORY OF HYPERPARATHYROIDISM: Primary | ICD-10-CM

## 2022-05-26 DIAGNOSIS — E04.2 MULTIPLE THYROID NODULES: ICD-10-CM

## 2022-06-14 ENCOUNTER — OFFICE VISIT (OUTPATIENT)
Dept: ENDOCRINOLOGY | Age: 52
End: 2022-06-14
Payer: COMMERCIAL

## 2022-06-14 VITALS
HEART RATE: 66 BPM | BODY MASS INDEX: 48.89 KG/M2 | OXYGEN SATURATION: 97 % | WEIGHT: 276 LBS | DIASTOLIC BLOOD PRESSURE: 78 MMHG | SYSTOLIC BLOOD PRESSURE: 130 MMHG

## 2022-06-14 DIAGNOSIS — E04.2 MULTIPLE THYROID NODULES: ICD-10-CM

## 2022-06-14 DIAGNOSIS — C73 PAPILLARY THYROID CARCINOMA (HCC): Primary | ICD-10-CM

## 2022-06-14 DIAGNOSIS — Z86.39 HISTORY OF PRIMARY HYPERPARATHYROIDISM: ICD-10-CM

## 2022-06-14 PROCEDURE — 76536 US EXAM OF HEAD AND NECK: CPT | Performed by: INTERNAL MEDICINE

## 2022-06-14 PROCEDURE — 99214 OFFICE O/P EST MOD 30 MIN: CPT | Performed by: INTERNAL MEDICINE

## 2022-06-14 RX ORDER — TRAZODONE HYDROCHLORIDE 100 MG/1
100 TABLET ORAL NIGHTLY
COMMUNITY

## 2022-06-14 RX ORDER — AMLODIPINE BESYLATE 5 MG/1
5 TABLET ORAL DAILY
COMMUNITY
Start: 2022-06-06

## 2022-06-14 RX ORDER — BUDESONIDE AND FORMOTEROL FUMARATE DIHYDRATE 160; 4.5 UG/1; UG/1
2 AEROSOL RESPIRATORY (INHALATION) 2 TIMES DAILY
COMMUNITY

## 2022-06-14 NOTE — PROGRESS NOTES
Nandini Damian MD, [address] [facility]            Reason for visit: Follow-up of thyroid cancer, thyroid nodules, and primary hyperparathyroidism. ASSESSMENT AND PLAN:    1. Papillary thyroid carcinoma ([address]  he has a low risk thyroid cancer which has been adequately treated. I do not recommend completion thyroidectomy or radioactive iodine. She will require occasional head and neck ultrasound (performed today- unremarkable. She has a history of mild subclinical hypothyroidism which is asymptomatic; her most recent thyroid function is normal.  This requires no treatment.  - US,HEAD/NECK TISSUES,REAL TIME  - TSH; Future  - T4, Free; Future    2. Multiple thyroid nodules  She is status post left lobectomy. Preoperative ultrasound demonstrated a nodule in the right lobe. That is not evident on today's ultrasound. 3. History of primary hyperparathyroidism  She remains normocalcemic following redo cervical exploration and parathyroidectomy with Dr. Tommie Castro 11/13/2020.    - Comprehensive Metabolic Panel; Future  - Vitamin D 25 Hydroxy; Future      PROCEDURES:    HEAD AND NECK ULTRASOUND    Date of study:  6/14/2022    Performing/interpreting physician:  Nandini Damian MD, FACE    Indication: History of thyroid cancer    Technique:  Using a 12 MHz linear transducer, multiple real-time planar images were obtained of the thyroid and surrounding tissues. Findings:  Right lobe 1.70 x 2.11 x 3.62 cm, isthmus and left lobe surgically absent. Heterogeneous echotexture. Normal blood flow. No nodules. No cervical lymphadenopathy. Impression: Status post left lobectomy and isthmusectomy. Otherwise unremarkable thyroid/head and neck ultrasound. Follow-up plan:  Return in about 6 months (around 12/14/2022). History of Present Illness:    Trey Ann is here for follow-up of hypercalcemia due to primary hyperparathyroidism.   She underwent left superior and left inferior parathyroidectomy 2020 with Dr. Tommie Castro at [facility]. She experienced an early recurrence and underwent redo cervical exploration and right inferior parathyroidectomy (intrathyroidal parathyroid gland) 2020 with Dr. Tommie Castro.     Presentation/diagnosis: first noted in 2019 on routine labs     Related comorbidies/clinical features:   -Metabolic bone disease: no prior assessment  -Fragility fractures: none  -Nephrolithiasis: none  -Renal dysfunction: normal renal function  -Thiazide diuretic use: HCTZ since May 2019     Symptoms:  See review of systems below     Imagin2020: Ultrasound ([address] [facility])- No parathyroid adenoma. Multiple thyroid nodules. 0.9 cm hypoechoic nodule at the right lower pole (TR 4). 2.6 cm hypoechoic nodule in the left lobe (TR 4).     2020: Technetium 99/sestamibi parathyroid scan ([facility])- Small solitary anterior midline supra thyroid focus of persistent uptake, possibly a parathyroid adenoma.     3/4/2020: CT soft tissue neck with and without contrast [facility])- No specific evidence for a parathyroid adenoma.   Multinodular thyroid.     2020: CT soft tissue neck with and without contrast [facility])- No CT evidence of a parathyroid adenoma.     2020: Technetium 99/sestamibi parathyroid scan [facility])- No scintigraphic evidence of parathyroid adenoma.     Labs:  2013: Calcium 9.2.  3/11/2014: Calcium 9.6.  2014: Calcium 9.6.  2015: Calcium 9.4.  2015: Calcium 9.7.  2017: Calcium 10.0, 25-hydroxy vitamin D 44.  2019: Calcium 9.9.  2019: Calcium 9.8.  2019: Calcium 10.4.  2019: Calcium 9.4.  2019: Calcium 9.9, 25-hydroxy vitamin D 37.  12/3/2019: Calcium 11.1, intact parathyroid hormone 125.  1/10/2020: Calcium 10.1, albumin 4.0.  2020: Calcium 10.7, ionized calcium 5.9 (reference 4.5-5.6), phosphorus 3.9, intact parathyroid hormone 40, 25-hydroxy vitamin D 38.8.  5/21/2020: Intraoperative parathyroid hormone 99 --> 96, calcium 10.3.  7/15/2020: Calcium 10.6, ionized calcium 5.9 (reference 4.5-5.6), phosphorus 5.9, intact parathyroid hormone 59, 25-hydroxy vitamin D 45.6.  8/5/2020: Calcium 10.5, intact parathyroid hormone 182.  11/13/2020: Calcium 10.1, intraoperative parathyroid hormone 125 --> 5 (96% decrease). .    11/18/2020: Calcium 9.2, intact parathyroid hormone 29.  12/1/2020: Calcium 9.7, phosphorus 4.4, albumin 4.3, intact parathyroid hormone 20, 25-hydroxy vitamin D 52.3.  6/10/2021: Calcium 9.7, phosphorus 4.1, albumin 4.1, intact parathyroid hormone 17, 25-hydroxy vitamin D 75.2  12/8/2021: Calcium 9.4, phosphorus 3.7, albumin 4.5, intact parathyroid hormone 22, 25-hydroxy vitamin D 86.9.  6/10/2022: Calcium 9.6, albumin 4.2, 25-hydroxy vitamin D 83. 0.        THYROID CANCER  Berry Merlin is seen in the [place] [address] for follow-up of thyroid cancer.     Type of thyroid cancer: papillary thyroid cancer     Date thyroid cancer diagnosed: 5/21/2020 at the time of thyroid/parathyroid surgery     Date(s) of surgery: 5/21/2020 (left thyroid lobectomy and limited lymphadenectomy, parathyroidectomy)     Surgical pathology: 0.8 cm papillary carcinoma in the left lobe. Uninvolved surgical margin. No angioinvasion, no lymphatic invasion no extrathyroidal extension. 2/3 level VI lymph nodes involved.   Background lymphocytic thyroiditis and multinodular hyperplasia.     AJCC stage: T1 - Tumor 2 cm or less in greatest dimension limited to the thyroid, N1a - Metastasis to Level VI (pretracheal, paratracheal, and prelaryngeal/Delphian lymph nodes), MX - Distant metastasis cannot be assessed     Radioactive iodine treatment: none     Treatment of hypothyroidism: none     Labs:  7/25/2017:TSH 1.37.  1/29/2019: TSH 1.47.  4/23/2019: TSH 1.89.  8/2/2019: TSH 1.47.  1/10/2020: TSH 0.908, T4 9.7.  7/15/2020: TSH 2.340, free T4 0.96.  12/1/2020: TSH 4.330, free T4 0.85.  6/10/2021: TSH 5.600, free T4 0.87.  12/8/2021: TSH 6.460, free T4 0.99.  6/10/2022: TSH 2.590, free T4 0.96.     Imaging:   Before surgery:  2/18/2020: Ultrasound ([address] [place] [address])- Multiple thyroid nodules. 0.9 cm hypoechoic nodule at the right lower pole (TR 4). 2.6 cm hypoechoic nodule in the left lobe (TR 4).     Since surgery:  6/14/2022: Ultrasound ([place])- Right lobe 1.70 x 2.11 x 3.62 cm, isthmus and left lobe surgically absent. Heterogeneous echotexture. Normal blood flow. No nodules. No cervical lymphadenopathy.     Current symptoms: See review of systems below     Family history of thyroid cancer:  No    Review of Systems   Constitutional: Negative for fatigue and unexpected weight change (stable over the last year). /78 (Site: Left Upper Arm, Position: Sitting)   Pulse 66   Wt 276 lb (125.2 kg)   SpO2 97%   BMI 48.89 kg/m²   Wt Readings from Last 3 Encounters:   06/14/22 276 lb (125.2 kg)   12/14/21 276 lb 6.4 oz (125.4 kg)   06/14/21 278 lb (126.1 kg)       Physical Exam  HENT:      Head: Normocephalic. Neck:      Thyroid: No thyroid mass or thyromegaly. Comments: Healed thyroidectomy scar. No palpable neck masses. Cardiovascular:      Rate and Rhythm: Normal rate. Pulmonary:      Effort: No respiratory distress. Breath sounds: Normal breath sounds. Neurological:      Mental Status: She is alert.    Psychiatric:         Mood and Affect: Mood normal.         Behavior: Behavior normal.         Orders Placed This Encounter   Procedures    US,HEAD/NECK TISSUES,REAL TIME    TSH     Standing Status:   Future     Standing Expiration Date:   6/14/2023    T4, Free     Standing Status:   Future     Standing Expiration Date:   6/14/2023    Comprehensive Metabolic Panel     Standing Status:   Future     Standing Expiration Date:   6/14/2023    Vitamin D 25 Hydroxy     Standing Status:   Future     Standing Expiration Date:   6/14/2023       Current Outpatient Medications   Medication Sig Dispense Refill    amLODIPine (NORVASC) 5 MG tablet Take 5 mg by mouth daily      traZODone (DESYREL) 100 MG tablet Take 100 mg by mouth nightly      budesonide-formoterol (SYMBICORT) 160-4.5 MCG/ACT AERO Inhale 2 puffs into the lungs 2 times daily      alum Hydroxide-Mag Carbonate 160-105 MG CHEW Take by mouth 2 times daily (before meals)      CPAP Machine MISC by Does not apply route      albuterol sulfate (PROAIR RESPICLICK) 766 (90 Base) MCG/ACT aerosol powder inhalation Inhale 2 puffs into the lungs      aspirin 81 MG EC tablet Take 81 mg by mouth      carvedilol (COREG) 12.5 MG tablet Take 12.5 mg by mouth 2 times daily      Cholecalciferol 50 MCG (2000 UT) TABS Take 2,000 Units by mouth daily      Cinnamon 500 MG CAPS Take 500 mg by mouth      desvenlafaxine succinate (PRISTIQ) 100 MG TB24 extended release tablet Take 1 tablet by mouth      fluticasone (FLONASE) 50 MCG/ACT nasal spray 2 sprays by Nasal route      folic acid (FOLVITE) 164 MCG tablet Take 400 mcg by mouth      hydroCHLOROthiazide (HYDRODIURIL) 25 MG tablet TAKE ONE TABLET BY MOUTH ONE TIME DAILY      losartan (COZAAR) 100 MG tablet Take 100 mg by mouth      melatonin 1 MG tablet Take by mouth      montelukast (SINGULAIR) 10 MG tablet Take 10 mg by mouth      omeprazole (PRILOSEC) 40 MG delayed release capsule TAKE ONE CAPSULE BY MOUTH ONE TIME DAILY       No current facility-administered medications for this visit. Heather Nam MD, FACE      Portions of this note were generated with the assistance of voice recognition software. As such, some errors in transcription may be present.

## 2022-12-10 LAB
25(OH)D3+25(OH)D2 SERPL-MCNC: 80.5 NG/ML (ref 30–100)
ALBUMIN SERPL-MCNC: 4.4 G/DL (ref 3.8–4.9)
ALBUMIN/GLOB SERPL: 1.5 {RATIO} (ref 1.2–2.2)
ALP SERPL-CCNC: 112 IU/L (ref 44–121)
ALT SERPL-CCNC: 18 IU/L (ref 0–32)
AST SERPL-CCNC: 17 IU/L (ref 0–40)
BILIRUB SERPL-MCNC: <0.2 MG/DL (ref 0–1.2)
BUN SERPL-MCNC: 11 MG/DL (ref 6–24)
BUN/CREAT SERPL: 23 (ref 9–23)
CALCIUM SERPL-MCNC: 9.3 MG/DL (ref 8.7–10.2)
CHLORIDE SERPL-SCNC: 102 MMOL/L (ref 96–106)
CO2 SERPL-SCNC: 24 MMOL/L (ref 20–29)
CREAT SERPL-MCNC: 0.47 MG/DL (ref 0.57–1)
EGFR: 114 ML/MIN/1.73
GLOBULIN SER CALC-MCNC: 2.9 G/DL (ref 1.5–4.5)
GLUCOSE SERPL-MCNC: 111 MG/DL (ref 70–99)
POTASSIUM SERPL-SCNC: 4.3 MMOL/L (ref 3.5–5.2)
PROT SERPL-MCNC: 7.3 G/DL (ref 6–8.5)
SODIUM SERPL-SCNC: 142 MMOL/L (ref 134–144)

## 2022-12-14 ENCOUNTER — OFFICE VISIT (OUTPATIENT)
Dept: ENDOCRINOLOGY | Age: 52
End: 2022-12-14
Payer: COMMERCIAL

## 2022-12-14 VITALS — WEIGHT: 260 LBS | BODY MASS INDEX: 46.06 KG/M2 | SYSTOLIC BLOOD PRESSURE: 120 MMHG | DIASTOLIC BLOOD PRESSURE: 76 MMHG

## 2022-12-14 DIAGNOSIS — C73 PAPILLARY THYROID CARCINOMA (HCC): Primary | ICD-10-CM

## 2022-12-14 DIAGNOSIS — E04.2 MULTIPLE THYROID NODULES: ICD-10-CM

## 2022-12-14 DIAGNOSIS — Z86.39 HISTORY OF PRIMARY HYPERPARATHYROIDISM: ICD-10-CM

## 2022-12-14 PROCEDURE — 99214 OFFICE O/P EST MOD 30 MIN: CPT | Performed by: INTERNAL MEDICINE

## 2022-12-14 PROCEDURE — 3074F SYST BP LT 130 MM HG: CPT | Performed by: INTERNAL MEDICINE

## 2022-12-14 PROCEDURE — 3078F DIAST BP <80 MM HG: CPT | Performed by: INTERNAL MEDICINE

## 2022-12-14 ASSESSMENT — ENCOUNTER SYMPTOMS
CONSTIPATION: 0
DIARRHEA: 0

## 2022-12-14 NOTE — PROGRESS NOTES
Shanon Brown MD, [address]            Reason for visit: Follow-up of thyroid cancer, thyroid nodules, and primary hyperparathyroidism. ASSESSMENT AND PLAN:    1. Papillary thyroid carcinoma ([address]  She has a low risk thyroid cancer which has been adequately treated. I do not recommend completion thyroidectomy or radioactive iodine. She will require occasional head and neck ultrasound (most recent ultrasound in June 2022 was unremarkable). She has a history of mild subclinical hypothyroidism which is asymptomatic; her most recent thyroid function is normal.  Thyroid labs drawn 12/9/2022 are pending.    - US,HEAD/NECK TISSUES,REAL TIME  - TSH; Future  - T4, Free; Future    2. Multiple thyroid nodules  She is status post left lobectomy. Preoperative ultrasound demonstrated a nodule in the right lobe. That is not evident on today's ultrasound. 3. History of primary hyperparathyroidism  She remains normocalcemic following redo cervical exploration and parathyroidectomy with Dr. Telma Shaw 11/13/2020.    - Comprehensive Metabolic Panel; Future  - Vitamin D 25 Hydroxy; Future            Follow-up plan:  Return in about 6 months (around 6/14/2023). History of Present Illness:    Bo Hall is here for follow-up of hypercalcemia due to primary hyperparathyroidism. She underwent left superior and left inferior parathyroidectomy 5/21/2020 with Dr. Telma Shaw at [facility]. She experienced an early recurrence and underwent redo cervical exploration and right inferior parathyroidectomy (intrathyroidal parathyroid gland) 11/13/2020 with Dr. Telma Shaw. Presentation/diagnosis: first noted in December 2019 on routine labs     Related comorbidies/clinical features:   -Metabolic bone disease: no prior assessment  -Fragility fractures: none  -Nephrolithiasis: none  -Renal dysfunction: normal renal function  -Thiazide diuretic use:  HCTZ since May      Symptoms:  See review of systems below     Imagin2020: Ultrasound ([facility])- No parathyroid adenoma. Multiple thyroid nodules. 0.9 cm hypoechoic nodule at the right lower pole (TR 4). 2.6 cm hypoechoic nodule in the left lobe (TR 4).     2020: Technetium 99/sestamibi parathyroid scan ([facility])- Small solitary anterior midline supra thyroid focus of persistent uptake, possibly a parathyroid adenoma. 3/4/2020: CT soft tissue neck with and without contrast [place])- No specific evidence for a parathyroid adenoma. Multinodular thyroid. 2020: CT soft tissue neck with and without contrast [place])- No CT evidence of a parathyroid adenoma. 2020: Technetium 99/sestamibi parathyroid scan [place] [facility])- No scintigraphic evidence of parathyroid adenoma. Labs:  2013: Calcium 9.2.  3/11/2014: Calcium 9.6.  2014: Calcium 9.6.  2015: Calcium 9.4.  2015: Calcium 9.7.  2017: Calcium 10.0, 25-hydroxy vitamin D 44.  2019: Calcium 9.9.  2019: Calcium 9.8.  2019: Calcium 10.4.  2019: Calcium 9.4.  2019: Calcium 9.9, 25-hydroxy vitamin D 37.  12/3/2019: Calcium 11.1, intact parathyroid hormone 125.  1/10/2020: Calcium 10.1, albumin 4.0.  2020: Calcium 10.7, ionized calcium 5.9 (reference 4.5-5.6), phosphorus 3.9, intact parathyroid hormone 40, 25-hydroxy vitamin D 38.8.  2020: Intraoperative parathyroid hormone 99 --> 96, calcium 10.3.  7/15/2020: Calcium 10.6, ionized calcium 5.9 (reference 4.5-5.6), phosphorus 5.9, intact parathyroid hormone 59, 25-hydroxy vitamin D 45.6.  2020: Calcium 10.5, intact parathyroid hormone 182.  2020: Calcium 10.1, intraoperative parathyroid hormone 125 --> 5 (96% decrease). .    2020: Calcium 9.2, intact parathyroid hormone 29.  2020: Calcium 9.7, phosphorus 4.4, albumin 4.3, intact parathyroid hormone 20, 25-hydroxy vitamin D 52.3.  6/10/2021: Calcium 9.7, phosphorus 4.1, albumin 4.1, intact parathyroid hormone 17, 25-hydroxy vitamin D 75.2  12/8/2021: Calcium 9.4, phosphorus 3.7, albumin 4.5, intact parathyroid hormone 22, 25-hydroxy vitamin D 86.9.  6/10/2022: Calcium 9.6, albumin 4.2, 25-hydroxy vitamin D 83.0.  12/9/2022: Calcium 9.3, albumin 4.4, 25-hydroxy vitamin D 80.5. THYROID CANCER  Brandan Ugalde is seen in the [place] [id number] for follow-up of thyroid cancer. Type of thyroid cancer: papillary thyroid cancer     Date thyroid cancer diagnosed: 5/21/2020 at the time of thyroid/parathyroid surgery     Date(s) of surgery: 5/21/2020 (left thyroid lobectomy and limited lymphadenectomy, parathyroidectomy)     Surgical pathology: 0.8 cm papillary carcinoma in the left lobe. Uninvolved surgical margin. No angioinvasion, no lymphatic invasion no extrathyroidal extension. 2/3 level VI lymph nodes involved. Background lymphocytic thyroiditis and multinodular hyperplasia. AJCC stage: T1 - Tumor 2 cm or less in greatest dimension limited to the thyroid, N1a - Metastasis to Level VI (pretracheal, paratracheal, and prelaryngeal/Delphian lymph nodes), MX - Distant metastasis cannot be assessed     Radioactive iodine treatment: none     Treatment of hypothyroidism: none     Labs:  7/25/2017:TSH 1.37.  1/29/2019: TSH 1.47.  4/23/2019: TSH 1.89.  8/2/2019: TSH 1.47.  1/10/2020: TSH 0.908, T4 9.7.  7/15/2020: TSH 2.340, free T4 0.96.  12/1/2020: TSH 4.330, free T4 0.85.  6/10/2021: TSH 5.600, free T4 0.87.  12/8/2021: TSH 6.460, free T4 0.99.  6/10/2022: TSH 2.590, free T4 0.96. Imaging:   Before surgery:  2/18/2020: Ultrasound ([facility])- Multiple thyroid nodules. 0.9 cm hypoechoic nodule at the right lower pole (TR 4). 2.6 cm hypoechoic nodule in the left lobe (TR 4). Since surgery:  6/14/2022: Ultrasound ([place])- Right lobe 1.70 x 2.11 x 3.62 cm, isthmus and left lobe surgically absent. Heterogeneous echotexture. Normal blood flow. No nodules. No cervical lymphadenopathy. Current symptoms: See review of systems below     Family history of thyroid cancer:  No    Review of Systems   Constitutional:  Negative for fatigue and unexpected weight change (intentionally lost ~16 pounds in the last few months). Cardiovascular:  Negative for palpitations. Gastrointestinal:  Negative for constipation and diarrhea. /76 (Site: Right Upper Arm, Position: Sitting)   Wt 260 lb (117.9 kg)   BMI 46.06 kg/m²   Wt Readings from Last 3 Encounters:   12/14/22 260 lb (117.9 kg)   06/14/22 276 lb (125.2 kg)   12/14/21 276 lb 6.4 oz (125.4 kg)       Physical Exam  HENT:      Head: Normocephalic. Neck:      Thyroid: No thyroid mass or thyromegaly. Comments: Healed thyroidectomy scar. No palpable neck masses. Cardiovascular:      Rate and Rhythm: Normal rate. Pulmonary:      Effort: No respiratory distress. Breath sounds: Normal breath sounds. Neurological:      Mental Status: She is alert.    Psychiatric:         Mood and Affect: Mood normal.         Behavior: Behavior normal.       Orders Placed This Encounter   Procedures    TSH     Standing Status:   Future     Standing Expiration Date:   12/14/2023    T4, Free     Standing Status:   Future     Standing Expiration Date:   12/14/2023    Comprehensive Metabolic Panel     Standing Status:   Future     Standing Expiration Date:   12/14/2023    Vitamin D 25 Hydroxy     Standing Status:   Future     Standing Expiration Date:   12/14/2023         Current Outpatient Medications   Medication Sig Dispense Refill    amLODIPine (NORVASC) 5 MG tablet Take 5 mg by mouth daily      traZODone (DESYREL) 100 MG tablet Take 100 mg by mouth nightly      budesonide-formoterol (SYMBICORT) 160-4.5 MCG/ACT AERO Inhale 2 puffs into the lungs 2 times daily      CPAP Machine MISC by Does not apply route      aspirin 81 MG EC tablet Take 81 mg by mouth      carvedilol (COREG) 12.5 MG tablet Take 12.5 mg by mouth 2 times daily      Cinnamon 500 MG CAPS Take 500 mg by mouth      desvenlafaxine succinate (PRISTIQ) 100 MG TB24 extended release tablet Take 1 tablet by mouth      folic acid (FOLVITE) 741 MCG tablet Take 400 mcg by mouth      hydroCHLOROthiazide (HYDRODIURIL) 25 MG tablet TAKE ONE TABLET BY MOUTH ONE TIME DAILY      losartan (COZAAR) 100 MG tablet Take 100 mg by mouth      melatonin 1 MG tablet Take by mouth      montelukast (SINGULAIR) 10 MG tablet Take 10 mg by mouth      omeprazole (PRILOSEC) 40 MG delayed release capsule TAKE ONE CAPSULE BY MOUTH ONE TIME DAILY      alum Hydroxide-Mag Carbonate 160-105 MG CHEW Take by mouth 2 times daily (before meals) (Patient not taking: Reported on 12/14/2022)      albuterol sulfate (PROAIR RESPICLICK) 822 (90 Base) MCG/ACT aerosol powder inhalation Inhale 2 puffs into the lungs (Patient not taking: Reported on 12/14/2022)      Cholecalciferol 50 MCG (2000 UT) TABS Take 2,000 Units by mouth daily      fluticasone (FLONASE) 50 MCG/ACT nasal spray 2 sprays by Nasal route (Patient not taking: Reported on 12/14/2022)       No current facility-administered medications for this visit. Maciej Jensen MD, FACE      Portions of this note were generated with the assistance of voice recognition software. As such, some errors in transcription may be present.

## 2023-06-10 LAB
25(OH)D3+25(OH)D2 SERPL-MCNC: 92 NG/ML (ref 30–100)
ALBUMIN SERPL-MCNC: 3.8 G/DL (ref 3.8–4.9)
ALBUMIN/GLOB SERPL: 1.2 {RATIO} (ref 1.2–2.2)
ALP SERPL-CCNC: 115 IU/L (ref 44–121)
ALT SERPL-CCNC: 17 IU/L (ref 0–32)
AST SERPL-CCNC: 13 IU/L (ref 0–40)
BILIRUB SERPL-MCNC: <0.2 MG/DL (ref 0–1.2)
BUN SERPL-MCNC: 10 MG/DL (ref 6–24)
BUN/CREAT SERPL: 16 (ref 9–23)
CALCIUM SERPL-MCNC: 9.5 MG/DL (ref 8.7–10.2)
CHLORIDE SERPL-SCNC: 102 MMOL/L (ref 96–106)
CO2 SERPL-SCNC: 23 MMOL/L (ref 20–29)
CREAT SERPL-MCNC: 0.62 MG/DL (ref 0.57–1)
EGFRCR SERPLBLD CKD-EPI 2021: 106 ML/MIN/1.73
GLOBULIN SER CALC-MCNC: 3.1 G/DL (ref 1.5–4.5)
GLUCOSE SERPL-MCNC: 129 MG/DL (ref 70–99)
POTASSIUM SERPL-SCNC: 4.3 MMOL/L (ref 3.5–5.2)
PROT SERPL-MCNC: 6.9 G/DL (ref 6–8.5)
SODIUM SERPL-SCNC: 140 MMOL/L (ref 134–144)
T4 FREE SERPL-MCNC: 1.1 NG/DL (ref 0.82–1.77)
T4 SERPL-MCNC: 9.6 UG/DL (ref 4.5–12)
TSH SERPL DL<=0.005 MIU/L-ACNC: 2.75 UIU/ML (ref 0.45–4.5)

## 2023-12-12 LAB
25(OH)D3+25(OH)D2 SERPL-MCNC: 67.1 NG/ML (ref 30–100)
ALBUMIN SERPL-MCNC: 4.2 G/DL (ref 3.8–4.9)
ALBUMIN/GLOB SERPL: 1.4 {RATIO} (ref 1.2–2.2)
ALP SERPL-CCNC: 118 IU/L (ref 44–121)
ALT SERPL-CCNC: 16 IU/L (ref 0–32)
AST SERPL-CCNC: 13 IU/L (ref 0–40)
BILIRUB SERPL-MCNC: 0.2 MG/DL (ref 0–1.2)
BUN SERPL-MCNC: 9 MG/DL (ref 6–24)
BUN/CREAT SERPL: 15 (ref 9–23)
CALCIUM SERPL-MCNC: 9.6 MG/DL (ref 8.7–10.2)
CHLORIDE SERPL-SCNC: 101 MMOL/L (ref 96–106)
CO2 SERPL-SCNC: 26 MMOL/L (ref 20–29)
CREAT SERPL-MCNC: 0.59 MG/DL (ref 0.57–1)
EGFRCR SERPLBLD CKD-EPI 2021: 107 ML/MIN/1.73
GLOBULIN SER CALC-MCNC: 3.1 G/DL (ref 1.5–4.5)
GLUCOSE SERPL-MCNC: 112 MG/DL (ref 70–99)
POTASSIUM SERPL-SCNC: 4.2 MMOL/L (ref 3.5–5.2)
PROT SERPL-MCNC: 7.3 G/DL (ref 6–8.5)
SODIUM SERPL-SCNC: 140 MMOL/L (ref 134–144)
T4 FREE SERPL-MCNC: 1 NG/DL (ref 0.82–1.77)
TSH SERPL DL<=0.005 MIU/L-ACNC: 2.32 UIU/ML (ref 0.45–4.5)

## 2023-12-14 ENCOUNTER — OFFICE VISIT (OUTPATIENT)
Dept: ENDOCRINOLOGY | Age: 53
End: 2023-12-14
Payer: COMMERCIAL

## 2023-12-14 VITALS
BODY MASS INDEX: 47.47 KG/M2 | SYSTOLIC BLOOD PRESSURE: 120 MMHG | OXYGEN SATURATION: 95 % | WEIGHT: 268 LBS | HEART RATE: 65 BPM | DIASTOLIC BLOOD PRESSURE: 82 MMHG

## 2023-12-14 DIAGNOSIS — Z86.39 HISTORY OF PRIMARY HYPERPARATHYROIDISM: ICD-10-CM

## 2023-12-14 DIAGNOSIS — E04.2 MULTIPLE THYROID NODULES: ICD-10-CM

## 2023-12-14 DIAGNOSIS — C73 PAPILLARY THYROID CARCINOMA (HCC): Primary | ICD-10-CM

## 2023-12-14 PROCEDURE — 3079F DIAST BP 80-89 MM HG: CPT | Performed by: INTERNAL MEDICINE

## 2023-12-14 PROCEDURE — 99214 OFFICE O/P EST MOD 30 MIN: CPT | Performed by: INTERNAL MEDICINE

## 2023-12-14 PROCEDURE — 3074F SYST BP LT 130 MM HG: CPT | Performed by: INTERNAL MEDICINE

## 2023-12-14 RX ORDER — LEVOCETIRIZINE DIHYDROCHLORIDE 5 MG/1
5 TABLET, FILM COATED ORAL DAILY
COMMUNITY
Start: 2023-12-07

## 2023-12-14 NOTE — PROGRESS NOTES
52.3.  6/10/2021: Calcium 9.7, phosphorus 4.1, albumin 4.1, intact parathyroid hormone 17, 25-hydroxy vitamin D 75.2  12/8/2021: Calcium 9.4, phosphorus 3.7, albumin 4.5, intact parathyroid hormone 22, 25-hydroxy vitamin D 86.9.  6/10/2022: Calcium 9.6, albumin 4.2, 25-hydroxy vitamin D 83.0.  12/9/2022: Calcium 9.3, albumin 4.4, 25-hydroxy vitamin D 80.5.  4/25/2023: Calcium 9.2, albumin 4.1.  6/9/2023: Calcium 9.5, albumin 3.8, 25-hydroxy vitamin D 92.0.  12/5/2023: Calcium 9.1, albumin 4.0, 25-hydroxy vitamin D 52. .  12/11/2023: Calcium 9.6, albumin 4.2, 25-hydroxy vitamin D 67.1. THYROID CANCER  Zenaida Dia is seen in the [facility] for follow-up of thyroid cancer. Type of thyroid cancer: papillary thyroid cancer     Date thyroid cancer diagnosed: 5/21/2020 at the time of thyroid/parathyroid surgery     Date(s) of surgery: 5/21/2020 (left thyroid lobectomy and limited lymphadenectomy, parathyroidectomy)     Surgical pathology: 0.8 cm papillary carcinoma in the left lobe. Uninvolved surgical margin. No angioinvasion, no lymphatic invasion no extrathyroidal extension. 2/3 level VI lymph nodes involved. Background lymphocytic thyroiditis and multinodular hyperplasia.      AJCC stage: T1 - Tumor 2 cm or less in greatest dimension limited to the thyroid, N1a - Metastasis to Level VI (pretracheal, paratracheal, and prelaryngeal/Delphian lymph nodes), MX - Distant metastasis cannot be assessed     Radioactive iodine treatment: none     Treatment of hypothyroidism: none     Labs:  7/25/2017:TSH 1.37.  1/29/2019: TSH 1.47.  4/23/2019: TSH 1.89.  8/2/2019: TSH 1.47.  1/10/2020: TSH 0.908, T4 9.7.  7/15/2020: TSH 2.340, free T4 0.96.  12/1/2020: TSH 4.330, free T4 0.85.  6/10/2021: TSH 5.600, free T4 0.87.  12/8/2021: TSH 6.460, free T4 0.99.  6/10/2022: TSH 2.590, free T4 0.96.  12/9/2022: TSH 4.0, free T4 by dialysis 1.3.  4/25/2023: TSH 2.87.  6/9/2023: TSH 2.750, free T4 1.10, T4

## 2024-12-13 LAB
25(OH)D3+25(OH)D2 SERPL-MCNC: 79.1 NG/ML (ref 30–100)
ALBUMIN SERPL-MCNC: 4 G/DL (ref 3.8–4.9)
ALP SERPL-CCNC: 119 IU/L (ref 44–121)
ALT SERPL-CCNC: 18 IU/L (ref 0–32)
AST SERPL-CCNC: 19 IU/L (ref 0–40)
BILIRUB SERPL-MCNC: <0.2 MG/DL (ref 0–1.2)
BUN SERPL-MCNC: 9 MG/DL (ref 6–24)
BUN/CREAT SERPL: 14 (ref 9–23)
CALCIUM SERPL-MCNC: 9.4 MG/DL (ref 8.7–10.2)
CHLORIDE SERPL-SCNC: 101 MMOL/L (ref 96–106)
CO2 SERPL-SCNC: 25 MMOL/L (ref 20–29)
CREAT SERPL-MCNC: 0.63 MG/DL (ref 0.57–1)
EGFRCR SERPLBLD CKD-EPI 2021: 105 ML/MIN/1.73
GLOBULIN SER CALC-MCNC: 3.3 G/DL (ref 1.5–4.5)
GLUCOSE SERPL-MCNC: 137 MG/DL (ref 70–99)
POTASSIUM SERPL-SCNC: 4 MMOL/L (ref 3.5–5.2)
PROT SERPL-MCNC: 7.3 G/DL (ref 6–8.5)
SODIUM SERPL-SCNC: 140 MMOL/L (ref 134–144)
T4 FREE SERPL-MCNC: 1.05 NG/DL (ref 0.82–1.77)
TSH SERPL DL<=0.005 MIU/L-ACNC: 3.73 UIU/ML (ref 0.45–4.5)

## 2024-12-13 NOTE — PROGRESS NOTES
D [address]  12/11/2023: Calcium 9.6, albumin 4.2, 25-hydroxy vitamin D 67.1.  12/12/2024: Calcium 9.4, albumin 4.0, 25-hydroxy vitamin D 79.1.        THYROID CANCER  Princess Shepherd is seen [facility] for follow-up of thyroid cancer.     Type of thyroid cancer: papillary thyroid cancer     Date thyroid cancer diagnosed: 5/21/2020 at the time of thyroid/parathyroid surgery     Date(s) of surgery: 5/21/2020 (left thyroid lobectomy and limited lymphadenectomy, parathyroidectomy)     Surgical pathology: 0.8 cm papillary carcinoma in the left lobe.  Uninvolved surgical margin.  No angioinvasion, no lymphatic invasion no extrathyroidal extension.  2/3 level VI lymph nodes involved.  Background lymphocytic thyroiditis and multinodular hyperplasia.     AJCC stage: T1 - Tumor 2 cm or less in greatest dimension limited to the thyroid, N1a - Metastasis to Level VI (pretracheal, paratracheal, and prelaryngeal/Delphian lymph nodes), MX - Distant metastasis cannot be assessed     Radioactive iodine treatment: none     Treatment of hypothyroidism: none     Labs:  7/25/2017:TSH 1.37.  1/29/2019: TSH 1.47.  4/23/2019: TSH 1.89.  8/2/2019: TSH 1.47.  1/10/2020: TSH 0.908, T4 9.7.  7/15/2020: TSH 2.340, free T4 0.96.  12/1/2020: TSH 4.330, free T4 0.85.  6/10/2021: TSH 5.600, free T4 0.87.  12/8/2021: TSH 6.460, free T4 0.99.  6/10/2022: TSH 2.590, free T4 0.96.  12/9/2022: TSH 4.0, free T4 by dialysis 1.3.  4/25/2023: TSH 2.87.  6/9/2023: TSH 2.750, free T4 1.10, T4 9.6.  12/5/2023: TSH 2.79.  12/11/2023: TSH 2.320, free T4 1.00.  12/12/2024: TSH 3.730, free T4 1.05.     Imaging:   Before surgery:  2/18/2020: Ultrasound ([place])- Multiple thyroid nodules.  0.9 cm hypoechoic nodule at the right lower pole (TR 4).  2.6 cm hypoechoic nodule in the left lobe (TR 4).     Since surgery:  6/14/2022: Ultrasound ([place])- Right lobe 1.70 x 2.11 x 3.62 cm, isthmus and left lobe surgically absent.  Heterogeneous echotexture.

## 2024-12-16 ENCOUNTER — OFFICE VISIT (OUTPATIENT)
Dept: ENDOCRINOLOGY | Age: 54
End: 2024-12-16
Payer: COMMERCIAL

## 2024-12-16 VITALS
BODY MASS INDEX: 48.73 KG/M2 | WEIGHT: 275 LBS | DIASTOLIC BLOOD PRESSURE: 80 MMHG | SYSTOLIC BLOOD PRESSURE: 122 MMHG | HEIGHT: 63 IN | OXYGEN SATURATION: 98 % | HEART RATE: 63 BPM

## 2024-12-16 DIAGNOSIS — E04.2 MULTIPLE THYROID NODULES: ICD-10-CM

## 2024-12-16 DIAGNOSIS — Z85.850 HISTORY OF THYROID CANCER: Primary | ICD-10-CM

## 2024-12-16 DIAGNOSIS — Z86.39 HISTORY OF PRIMARY HYPERPARATHYROIDISM: ICD-10-CM

## 2024-12-16 PROCEDURE — 3079F DIAST BP 80-89 MM HG: CPT | Performed by: INTERNAL MEDICINE

## 2024-12-16 PROCEDURE — 99214 OFFICE O/P EST MOD 30 MIN: CPT | Performed by: INTERNAL MEDICINE

## 2024-12-16 PROCEDURE — 3074F SYST BP LT 130 MM HG: CPT | Performed by: INTERNAL MEDICINE

## 2024-12-16 PROCEDURE — 76536 US EXAM OF HEAD AND NECK: CPT | Performed by: INTERNAL MEDICINE

## 2024-12-17 ENCOUNTER — PATIENT MESSAGE (OUTPATIENT)
Dept: ENDOCRINOLOGY | Age: 54
End: 2024-12-17